# Patient Record
Sex: FEMALE | Race: BLACK OR AFRICAN AMERICAN | NOT HISPANIC OR LATINO
[De-identification: names, ages, dates, MRNs, and addresses within clinical notes are randomized per-mention and may not be internally consistent; named-entity substitution may affect disease eponyms.]

---

## 2017-02-08 ENCOUNTER — APPOINTMENT (OUTPATIENT)
Dept: OBGYN | Facility: CLINIC | Age: 67
End: 2017-02-08

## 2017-02-08 VITALS
HEIGHT: 61 IN | DIASTOLIC BLOOD PRESSURE: 90 MMHG | WEIGHT: 166 LBS | BODY MASS INDEX: 31.34 KG/M2 | SYSTOLIC BLOOD PRESSURE: 150 MMHG

## 2017-02-08 DIAGNOSIS — Z63.5 DISRUPTION OF FAMILY BY SEPARATION AND DIVORCE: ICD-10-CM

## 2017-02-08 RX ORDER — ACETAMINOPHEN/DIPHENHYDRAMINE 500MG-25MG
TABLET ORAL
Refills: 0 | Status: ACTIVE | COMMUNITY

## 2017-02-08 SDOH — SOCIAL STABILITY - SOCIAL INSECURITY: DISRUPTION OF FAMILY BY SEPARATION AND DIVORCE: Z63.5

## 2017-02-14 LAB — CYTOLOGY CVX/VAG DOC THIN PREP: NORMAL

## 2017-02-28 ENCOUNTER — APPOINTMENT (OUTPATIENT)
Dept: MAMMOGRAPHY | Facility: IMAGING CENTER | Age: 67
End: 2017-02-28

## 2017-02-28 ENCOUNTER — OUTPATIENT (OUTPATIENT)
Dept: OUTPATIENT SERVICES | Facility: HOSPITAL | Age: 67
LOS: 1 days | End: 2017-02-28
Payer: MEDICARE

## 2017-02-28 ENCOUNTER — APPOINTMENT (OUTPATIENT)
Dept: ULTRASOUND IMAGING | Facility: IMAGING CENTER | Age: 67
End: 2017-02-28

## 2017-02-28 DIAGNOSIS — R92.2 INCONCLUSIVE MAMMOGRAM: ICD-10-CM

## 2017-02-28 DIAGNOSIS — Z12.31 ENCOUNTER FOR SCREENING MAMMOGRAM FOR MALIGNANT NEOPLASM OF BREAST: ICD-10-CM

## 2017-02-28 PROCEDURE — 77063 BREAST TOMOSYNTHESIS BI: CPT

## 2017-02-28 PROCEDURE — 76641 ULTRASOUND BREAST COMPLETE: CPT

## 2017-02-28 PROCEDURE — 77067 SCR MAMMO BI INCL CAD: CPT

## 2017-03-06 ENCOUNTER — APPOINTMENT (OUTPATIENT)
Dept: ULTRASOUND IMAGING | Facility: IMAGING CENTER | Age: 67
End: 2017-03-06

## 2017-03-12 ENCOUNTER — FORM ENCOUNTER (OUTPATIENT)
Age: 67
End: 2017-03-12

## 2017-03-13 ENCOUNTER — APPOINTMENT (OUTPATIENT)
Dept: ULTRASOUND IMAGING | Facility: IMAGING CENTER | Age: 67
End: 2017-03-13

## 2017-03-13 ENCOUNTER — OUTPATIENT (OUTPATIENT)
Dept: OUTPATIENT SERVICES | Facility: HOSPITAL | Age: 67
LOS: 1 days | End: 2017-03-13
Payer: MEDICARE

## 2017-03-13 DIAGNOSIS — R92.2 INCONCLUSIVE MAMMOGRAM: ICD-10-CM

## 2017-03-13 PROCEDURE — 76642 ULTRASOUND BREAST LIMITED: CPT

## 2017-04-21 ENCOUNTER — APPOINTMENT (OUTPATIENT)
Dept: ULTRASOUND IMAGING | Facility: IMAGING CENTER | Age: 67
End: 2017-04-21

## 2017-04-21 ENCOUNTER — OUTPATIENT (OUTPATIENT)
Dept: OUTPATIENT SERVICES | Facility: HOSPITAL | Age: 67
LOS: 1 days | End: 2017-04-21
Payer: MEDICARE

## 2017-04-21 DIAGNOSIS — Z00.8 ENCOUNTER FOR OTHER GENERAL EXAMINATION: ICD-10-CM

## 2017-04-21 PROCEDURE — 76700 US EXAM ABDOM COMPLETE: CPT

## 2017-12-16 ENCOUNTER — EMERGENCY (EMERGENCY)
Facility: HOSPITAL | Age: 67
LOS: 1 days | Discharge: HOME | End: 2017-12-16
Attending: EMERGENCY MEDICINE | Admitting: EMERGENCY MEDICINE
Payer: MEDICARE

## 2017-12-16 VITALS
HEART RATE: 72 BPM | OXYGEN SATURATION: 97 % | SYSTOLIC BLOOD PRESSURE: 130 MMHG | HEIGHT: 62 IN | WEIGHT: 153 LBS | RESPIRATION RATE: 18 BRPM | DIASTOLIC BLOOD PRESSURE: 82 MMHG | TEMPERATURE: 99 F

## 2017-12-16 PROCEDURE — 99284 EMERGENCY DEPT VISIT MOD MDM: CPT | Mod: 25

## 2017-12-16 PROCEDURE — 70450 CT HEAD/BRAIN W/O DYE: CPT

## 2017-12-16 PROCEDURE — 70450 CT HEAD/BRAIN W/O DYE: CPT | Mod: 26

## 2017-12-16 PROCEDURE — 73660 X-RAY EXAM OF TOE(S): CPT | Mod: 26,LT

## 2017-12-16 PROCEDURE — 73660 X-RAY EXAM OF TOE(S): CPT

## 2017-12-16 PROCEDURE — 99284 EMERGENCY DEPT VISIT MOD MDM: CPT

## 2017-12-16 RX ORDER — ACETAMINOPHEN 500 MG
975 TABLET ORAL ONCE
Qty: 0 | Refills: 0 | Status: COMPLETED | OUTPATIENT
Start: 2017-12-16 | End: 2017-12-16

## 2017-12-16 RX ADMIN — Medication 975 MILLIGRAM(S): at 15:33

## 2017-12-16 NOTE — ED PROVIDER NOTE - ATTENDING CONTRIBUTION TO CARE
67F presenting with headache and left 3rd toe pain after fall at 2am; no loc, no n/v, no AC use.  On exam, well appearing in NAD.  nrl cardiac/lung sounds on auscultation.  abd soft nt/nd, no focal neurologic deficits.  3rd left toe painful to touch, no gross deformity, sensation and cap refill intact.  Xray obtained, detailed 3rd toe fracture.  Splinted and place in hard sole shoe.  CT head shows no acute findings.  Stable for dc with podiatry f/u.

## 2017-12-16 NOTE — ED PROCEDURE NOTE - ATTENDING CONTRIBUTION TO CARE
I have participated in and supervised all key portions of the above procedures and agree with the above documentation. MAME Contreras MD

## 2017-12-16 NOTE — ED PROVIDER NOTE - PHYSICAL EXAMINATION
NAD, VSS, Afebrile, No facial or scalp hematoma or swelling, No spinal tender, No RIB or CVA tender, No pelvic or hip tender with full ROMs of hips, + left foot drop with diminished sensory (known, chronic, no changes). + left 3rd toe swelling with tender, no deformity.

## 2017-12-16 NOTE — ED PROVIDER NOTE - OBJECTIVE STATEMENT
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 Pt stated she slipped when she walked down stairs at 2am and fell backward, hit he back of head on a step. Denies LOC or visual changes. Denies dizziness or N/V. Denies new sensory changes or weakness to extremities. Denies CP/SOB/ABD pain or Pelvis/ hip pain. Denies fever, chills or recnet sickness.

## 2017-12-16 NOTE — ED PROCEDURE NOTE - CPROC ED POST PROC CARE GUIDE1
Instructed patient/caregiver to follow-up with primary care physician./Elevate the injured extremity as instructed./Keep the cast/splint/dressing clean and dry./Instructed patient/caregiver regarding signs and symptoms of infection./Verbal/written post procedure instructions were given to patient/caregiver.

## 2018-04-19 ENCOUNTER — FORM ENCOUNTER (OUTPATIENT)
Age: 68
End: 2018-04-19

## 2018-04-20 ENCOUNTER — OUTPATIENT (OUTPATIENT)
Dept: OUTPATIENT SERVICES | Facility: HOSPITAL | Age: 68
LOS: 1 days | End: 2018-04-20
Payer: MEDICARE

## 2018-04-20 ENCOUNTER — APPOINTMENT (OUTPATIENT)
Dept: ULTRASOUND IMAGING | Facility: IMAGING CENTER | Age: 68
End: 2018-04-20
Payer: MEDICARE

## 2018-04-20 ENCOUNTER — APPOINTMENT (OUTPATIENT)
Dept: MAMMOGRAPHY | Facility: IMAGING CENTER | Age: 68
End: 2018-04-20
Payer: MEDICARE

## 2018-04-20 DIAGNOSIS — Z00.00 ENCOUNTER FOR GENERAL ADULT MEDICAL EXAMINATION WITHOUT ABNORMAL FINDINGS: ICD-10-CM

## 2018-04-20 PROCEDURE — 77067 SCR MAMMO BI INCL CAD: CPT | Mod: 26

## 2018-04-20 PROCEDURE — 76641 ULTRASOUND BREAST COMPLETE: CPT

## 2018-04-20 PROCEDURE — 77063 BREAST TOMOSYNTHESIS BI: CPT | Mod: 26

## 2018-04-20 PROCEDURE — 76641 ULTRASOUND BREAST COMPLETE: CPT | Mod: 26,50

## 2018-04-20 PROCEDURE — 77063 BREAST TOMOSYNTHESIS BI: CPT

## 2018-04-20 PROCEDURE — 77067 SCR MAMMO BI INCL CAD: CPT

## 2018-05-05 ENCOUNTER — TRANSCRIPTION ENCOUNTER (OUTPATIENT)
Age: 68
End: 2018-05-05

## 2018-10-23 ENCOUNTER — OTHER (OUTPATIENT)
Age: 68
End: 2018-10-23

## 2018-10-23 PROBLEM — M81.0 AGE-RELATED OSTEOPOROSIS WITHOUT CURRENT PATHOLOGICAL FRACTURE: Chronic | Status: ACTIVE | Noted: 2017-12-16

## 2018-10-23 PROBLEM — I10 ESSENTIAL (PRIMARY) HYPERTENSION: Chronic | Status: ACTIVE | Noted: 2017-12-16

## 2018-10-23 PROBLEM — F41.9 ANXIETY DISORDER, UNSPECIFIED: Chronic | Status: ACTIVE | Noted: 2017-12-16

## 2018-11-05 ENCOUNTER — FORM ENCOUNTER (OUTPATIENT)
Age: 68
End: 2018-11-05

## 2018-11-06 ENCOUNTER — APPOINTMENT (OUTPATIENT)
Dept: ULTRASOUND IMAGING | Facility: IMAGING CENTER | Age: 68
End: 2018-11-06
Payer: MEDICARE

## 2018-11-06 ENCOUNTER — OUTPATIENT (OUTPATIENT)
Dept: OUTPATIENT SERVICES | Facility: HOSPITAL | Age: 68
LOS: 1 days | End: 2018-11-06
Payer: MEDICARE

## 2018-11-06 DIAGNOSIS — R10.2 PELVIC AND PERINEAL PAIN: ICD-10-CM

## 2018-11-06 PROCEDURE — 76830 TRANSVAGINAL US NON-OB: CPT

## 2018-11-06 PROCEDURE — 76856 US EXAM PELVIC COMPLETE: CPT | Mod: 26

## 2018-11-06 PROCEDURE — 76856 US EXAM PELVIC COMPLETE: CPT

## 2018-11-06 PROCEDURE — 76830 TRANSVAGINAL US NON-OB: CPT | Mod: 26

## 2018-11-29 ENCOUNTER — APPOINTMENT (OUTPATIENT)
Dept: OBGYN | Facility: CLINIC | Age: 68
End: 2018-11-29
Payer: MEDICARE

## 2018-11-29 VITALS
SYSTOLIC BLOOD PRESSURE: 148 MMHG | DIASTOLIC BLOOD PRESSURE: 78 MMHG | BODY MASS INDEX: 28.16 KG/M2 | WEIGHT: 153 LBS | HEIGHT: 62 IN

## 2018-11-29 PROCEDURE — 99213 OFFICE O/P EST LOW 20 MIN: CPT

## 2019-04-04 ENCOUNTER — OTHER (OUTPATIENT)
Age: 69
End: 2019-04-04

## 2019-04-08 ENCOUNTER — APPOINTMENT (OUTPATIENT)
Dept: GASTROENTEROLOGY | Facility: CLINIC | Age: 69
End: 2019-04-08
Payer: MEDICARE

## 2019-04-08 VITALS
HEART RATE: 84 BPM | RESPIRATION RATE: 15 BRPM | OXYGEN SATURATION: 98 % | HEIGHT: 62 IN | SYSTOLIC BLOOD PRESSURE: 144 MMHG | WEIGHT: 150 LBS | DIASTOLIC BLOOD PRESSURE: 82 MMHG | BODY MASS INDEX: 27.6 KG/M2

## 2019-04-08 DIAGNOSIS — Z86.010 PERSONAL HISTORY OF COLONIC POLYPS: ICD-10-CM

## 2019-04-08 DIAGNOSIS — Z86.79 PERSONAL HISTORY OF OTHER DISEASES OF THE CIRCULATORY SYSTEM: ICD-10-CM

## 2019-04-08 PROCEDURE — 99202 OFFICE O/P NEW SF 15 MIN: CPT

## 2019-04-08 RX ORDER — DILTIAZEM HYDROCHLORIDE 120 MG/1
120 TABLET ORAL
Refills: 0 | Status: ACTIVE | COMMUNITY

## 2019-04-08 NOTE — ASSESSMENT
[FreeTextEntry1] : This is a 69-year-old female presenting for colon cancer screening evaluation. I explained to her the risks, alternatives and benefits of a colonoscopy. Risk including but not limited to bleeding, perforation, infection adverse medication reaction. Questions were answered. She stated understanding.

## 2019-04-23 ENCOUNTER — FORM ENCOUNTER (OUTPATIENT)
Age: 69
End: 2019-04-23

## 2019-04-24 ENCOUNTER — APPOINTMENT (OUTPATIENT)
Dept: MAMMOGRAPHY | Facility: IMAGING CENTER | Age: 69
End: 2019-04-24
Payer: MEDICARE

## 2019-04-24 ENCOUNTER — APPOINTMENT (OUTPATIENT)
Dept: ULTRASOUND IMAGING | Facility: IMAGING CENTER | Age: 69
End: 2019-04-24
Payer: MEDICARE

## 2019-04-24 ENCOUNTER — OUTPATIENT (OUTPATIENT)
Dept: OUTPATIENT SERVICES | Facility: HOSPITAL | Age: 69
LOS: 1 days | End: 2019-04-24
Payer: MEDICARE

## 2019-04-24 DIAGNOSIS — Z01.419 ENCOUNTER FOR GYNECOLOGICAL EXAMINATION (GENERAL) (ROUTINE) WITHOUT ABNORMAL FINDINGS: ICD-10-CM

## 2019-04-24 PROCEDURE — 77067 SCR MAMMO BI INCL CAD: CPT | Mod: 26

## 2019-04-24 PROCEDURE — 76641 ULTRASOUND BREAST COMPLETE: CPT

## 2019-04-24 PROCEDURE — 77063 BREAST TOMOSYNTHESIS BI: CPT

## 2019-04-24 PROCEDURE — 76641 ULTRASOUND BREAST COMPLETE: CPT | Mod: 26,50

## 2019-04-24 PROCEDURE — 77063 BREAST TOMOSYNTHESIS BI: CPT | Mod: 26

## 2019-04-24 PROCEDURE — 77067 SCR MAMMO BI INCL CAD: CPT

## 2019-04-29 ENCOUNTER — TRANSCRIPTION ENCOUNTER (OUTPATIENT)
Age: 69
End: 2019-04-29

## 2019-05-02 ENCOUNTER — APPOINTMENT (OUTPATIENT)
Dept: OBGYN | Facility: CLINIC | Age: 69
End: 2019-05-02
Payer: MEDICARE

## 2019-05-02 VITALS — DIASTOLIC BLOOD PRESSURE: 84 MMHG | SYSTOLIC BLOOD PRESSURE: 134 MMHG

## 2019-05-02 VITALS
HEIGHT: 62 IN | BODY MASS INDEX: 27.97 KG/M2 | DIASTOLIC BLOOD PRESSURE: 84 MMHG | SYSTOLIC BLOOD PRESSURE: 168 MMHG | WEIGHT: 152 LBS

## 2019-05-02 PROCEDURE — G0101: CPT

## 2019-05-02 NOTE — COUNSELING
[Breast Self Exam] : breast self exam [Nutrition] : nutrition [Exercise] : exercise [Vitamins/Supplements] : vitamins/supplements [Bladder Hygiene] : bladder hygiene [Vaccines] : vaccines [Vulvar Hygiene] : vulvar hygiene

## 2019-05-02 NOTE — PHYSICAL EXAM
[Awake] : awake [Alert] : alert [Acute Distress] : no acute distress [Mass] : no breast mass [Nipple Discharge] : no nipple discharge [Axillary LAD] : no axillary lymphadenopathy [Soft] : soft [Tender] : non tender [Oriented x3] : oriented to person, place, and time [Normal] : uterus [Atrophy] : atrophy [No Bleeding] : there was no active vaginal bleeding [Uterine Adnexae] : were not tender and not enlarged [FreeTextEntry5] : posterior

## 2019-05-07 LAB — CYTOLOGY CVX/VAG DOC THIN PREP: NORMAL

## 2019-07-12 ENCOUNTER — APPOINTMENT (OUTPATIENT)
Dept: GASTROENTEROLOGY | Facility: AMBULATORY MEDICAL SERVICES | Age: 69
End: 2019-07-12
Payer: MEDICARE

## 2019-07-12 PROCEDURE — 45380 COLONOSCOPY AND BIOPSY: CPT

## 2019-11-02 ENCOUNTER — INPATIENT (INPATIENT)
Facility: HOSPITAL | Age: 69
LOS: 3 days | Discharge: HOME | DRG: 571 | End: 2019-11-06
Attending: INTERNAL MEDICINE | Admitting: INTERNAL MEDICINE
Payer: MEDICARE

## 2019-11-02 VITALS
WEIGHT: 156.97 LBS | RESPIRATION RATE: 20 BRPM | SYSTOLIC BLOOD PRESSURE: 150 MMHG | TEMPERATURE: 98 F | OXYGEN SATURATION: 96 % | HEIGHT: 62 IN | HEART RATE: 70 BPM | DIASTOLIC BLOOD PRESSURE: 83 MMHG

## 2019-11-02 DIAGNOSIS — I10 ESSENTIAL (PRIMARY) HYPERTENSION: ICD-10-CM

## 2019-11-02 DIAGNOSIS — L03.116 CELLULITIS OF LEFT LOWER LIMB: ICD-10-CM

## 2019-11-02 DIAGNOSIS — L03.90 CELLULITIS, UNSPECIFIED: ICD-10-CM

## 2019-11-02 DIAGNOSIS — C49.9 MALIGNANT NEOPLASM OF CONNECTIVE AND SOFT TISSUE, UNSPECIFIED: Chronic | ICD-10-CM

## 2019-11-02 LAB
ALBUMIN SERPL ELPH-MCNC: 3.8 G/DL — SIGNIFICANT CHANGE UP (ref 3.3–5)
ALP SERPL-CCNC: 61 U/L — SIGNIFICANT CHANGE UP (ref 40–120)
ALT FLD-CCNC: 13 U/L — SIGNIFICANT CHANGE UP (ref 10–45)
ANION GAP SERPL CALC-SCNC: 9 MMOL/L — SIGNIFICANT CHANGE UP (ref 5–17)
APPEARANCE UR: CLEAR — SIGNIFICANT CHANGE UP
APTT BLD: 29.8 SEC — SIGNIFICANT CHANGE UP (ref 27.5–36.3)
AST SERPL-CCNC: 20 U/L — SIGNIFICANT CHANGE UP (ref 10–40)
BASOPHILS # BLD AUTO: 0.03 K/UL — SIGNIFICANT CHANGE UP (ref 0–0.2)
BASOPHILS NFR BLD AUTO: 0.5 % — SIGNIFICANT CHANGE UP (ref 0–2)
BILIRUB SERPL-MCNC: 0.5 MG/DL — SIGNIFICANT CHANGE UP (ref 0.2–1.2)
BILIRUB UR-MCNC: NEGATIVE — SIGNIFICANT CHANGE UP
BUN SERPL-MCNC: 19 MG/DL — SIGNIFICANT CHANGE UP (ref 7–23)
CALCIUM SERPL-MCNC: 9.4 MG/DL — SIGNIFICANT CHANGE UP (ref 8.4–10.5)
CHLORIDE SERPL-SCNC: 100 MMOL/L — SIGNIFICANT CHANGE UP (ref 96–108)
CO2 SERPL-SCNC: 26 MMOL/L — SIGNIFICANT CHANGE UP (ref 22–31)
COLOR SPEC: YELLOW — SIGNIFICANT CHANGE UP
CREAT SERPL-MCNC: 0.54 MG/DL — SIGNIFICANT CHANGE UP (ref 0.5–1.3)
DIFF PNL FLD: NEGATIVE — SIGNIFICANT CHANGE UP
EOSINOPHIL # BLD AUTO: 0.06 K/UL — SIGNIFICANT CHANGE UP (ref 0–0.5)
EOSINOPHIL NFR BLD AUTO: 1 % — SIGNIFICANT CHANGE UP (ref 0–6)
GAS PNL BLDV: SIGNIFICANT CHANGE UP
GLUCOSE SERPL-MCNC: 102 MG/DL — HIGH (ref 70–99)
GLUCOSE UR QL: NEGATIVE — SIGNIFICANT CHANGE UP
HCT VFR BLD CALC: 35 % — SIGNIFICANT CHANGE UP (ref 34.5–45)
HGB BLD-MCNC: 11.3 G/DL — LOW (ref 11.5–15.5)
IMM GRANULOCYTES NFR BLD AUTO: 0.2 % — SIGNIFICANT CHANGE UP (ref 0–1.5)
INR BLD: 1.17 RATIO — HIGH (ref 0.88–1.16)
KETONES UR-MCNC: NEGATIVE — SIGNIFICANT CHANGE UP
LEUKOCYTE ESTERASE UR-ACNC: NEGATIVE — SIGNIFICANT CHANGE UP
LYMPHOCYTES # BLD AUTO: 1.83 K/UL — SIGNIFICANT CHANGE UP (ref 1–3.3)
LYMPHOCYTES # BLD AUTO: 29.8 % — SIGNIFICANT CHANGE UP (ref 13–44)
MCHC RBC-ENTMCNC: 29.1 PG — SIGNIFICANT CHANGE UP (ref 27–34)
MCHC RBC-ENTMCNC: 32.3 GM/DL — SIGNIFICANT CHANGE UP (ref 32–36)
MCV RBC AUTO: 90.2 FL — SIGNIFICANT CHANGE UP (ref 80–100)
MONOCYTES # BLD AUTO: 0.57 K/UL — SIGNIFICANT CHANGE UP (ref 0–0.9)
MONOCYTES NFR BLD AUTO: 9.3 % — SIGNIFICANT CHANGE UP (ref 2–14)
NEUTROPHILS # BLD AUTO: 3.64 K/UL — SIGNIFICANT CHANGE UP (ref 1.8–7.4)
NEUTROPHILS NFR BLD AUTO: 59.2 % — SIGNIFICANT CHANGE UP (ref 43–77)
NITRITE UR-MCNC: NEGATIVE — SIGNIFICANT CHANGE UP
NRBC # BLD: 0 /100 WBCS — SIGNIFICANT CHANGE UP (ref 0–0)
PH UR: 6.5 — SIGNIFICANT CHANGE UP (ref 5–8)
PLATELET # BLD AUTO: 261 K/UL — SIGNIFICANT CHANGE UP (ref 150–400)
POTASSIUM SERPL-MCNC: 3.5 MMOL/L — SIGNIFICANT CHANGE UP (ref 3.5–5.3)
POTASSIUM SERPL-SCNC: 3.5 MMOL/L — SIGNIFICANT CHANGE UP (ref 3.5–5.3)
PROT SERPL-MCNC: 8 G/DL — SIGNIFICANT CHANGE UP (ref 6–8.3)
PROT UR-MCNC: ABNORMAL
PROTHROM AB SERPL-ACNC: 13.5 SEC — HIGH (ref 10–12.9)
RBC # BLD: 3.88 M/UL — SIGNIFICANT CHANGE UP (ref 3.8–5.2)
RBC # FLD: 11.9 % — SIGNIFICANT CHANGE UP (ref 10.3–14.5)
SODIUM SERPL-SCNC: 135 MMOL/L — SIGNIFICANT CHANGE UP (ref 135–145)
SP GR SPEC: 1.02 — SIGNIFICANT CHANGE UP (ref 1.01–1.02)
UROBILINOGEN FLD QL: NEGATIVE — SIGNIFICANT CHANGE UP
WBC # BLD: 6.14 K/UL — SIGNIFICANT CHANGE UP (ref 3.8–10.5)
WBC # FLD AUTO: 6.14 K/UL — SIGNIFICANT CHANGE UP (ref 3.8–10.5)

## 2019-11-02 PROCEDURE — 73590 X-RAY EXAM OF LOWER LEG: CPT | Mod: 26,LT

## 2019-11-02 PROCEDURE — 99285 EMERGENCY DEPT VISIT HI MDM: CPT | Mod: GC

## 2019-11-02 PROCEDURE — 93010 ELECTROCARDIOGRAM REPORT: CPT

## 2019-11-02 PROCEDURE — 73701 CT LOWER EXTREMITY W/DYE: CPT | Mod: 26,LT

## 2019-11-02 RX ORDER — HEPARIN SODIUM 5000 [USP'U]/ML
5000 INJECTION INTRAVENOUS; SUBCUTANEOUS
Refills: 0 | Status: DISCONTINUED | OUTPATIENT
Start: 2019-11-02 | End: 2019-11-06

## 2019-11-02 RX ORDER — PIPERACILLIN AND TAZOBACTAM 4; .5 G/20ML; G/20ML
3.38 INJECTION, POWDER, LYOPHILIZED, FOR SOLUTION INTRAVENOUS ONCE
Refills: 0 | Status: COMPLETED | OUTPATIENT
Start: 2019-11-02 | End: 2019-11-02

## 2019-11-02 RX ORDER — LOSARTAN POTASSIUM 100 MG/1
100 TABLET, FILM COATED ORAL DAILY
Refills: 0 | Status: DISCONTINUED | OUTPATIENT
Start: 2019-11-02 | End: 2019-11-06

## 2019-11-02 RX ORDER — DIPHENHYDRAMINE HCL 50 MG
50 CAPSULE ORAL ONCE
Refills: 0 | Status: COMPLETED | OUTPATIENT
Start: 2019-11-02 | End: 2019-11-02

## 2019-11-02 RX ORDER — VANCOMYCIN HCL 1 G
500 VIAL (EA) INTRAVENOUS EVERY 12 HOURS
Refills: 0 | Status: DISCONTINUED | OUTPATIENT
Start: 2019-11-02 | End: 2019-11-04

## 2019-11-02 RX ORDER — GABAPENTIN 400 MG/1
300 CAPSULE ORAL DAILY
Refills: 0 | Status: DISCONTINUED | OUTPATIENT
Start: 2019-11-02 | End: 2019-11-03

## 2019-11-02 RX ORDER — VANCOMYCIN HCL 1 G
1000 VIAL (EA) INTRAVENOUS ONCE
Refills: 0 | Status: COMPLETED | OUTPATIENT
Start: 2019-11-02 | End: 2019-11-02

## 2019-11-02 RX ORDER — SODIUM CHLORIDE 9 MG/ML
1000 INJECTION, SOLUTION INTRAVENOUS ONCE
Refills: 0 | Status: COMPLETED | OUTPATIENT
Start: 2019-11-02 | End: 2019-11-02

## 2019-11-02 RX ORDER — DILTIAZEM HCL 120 MG
120 CAPSULE, EXT RELEASE 24 HR ORAL DAILY
Refills: 0 | Status: DISCONTINUED | OUTPATIENT
Start: 2019-11-02 | End: 2019-11-02

## 2019-11-02 RX ORDER — SERTRALINE 25 MG/1
50 TABLET, FILM COATED ORAL DAILY
Refills: 0 | Status: DISCONTINUED | OUTPATIENT
Start: 2019-11-02 | End: 2019-11-06

## 2019-11-02 RX ORDER — DILTIAZEM HCL 120 MG
120 CAPSULE, EXT RELEASE 24 HR ORAL DAILY
Refills: 0 | Status: DISCONTINUED | OUTPATIENT
Start: 2019-11-02 | End: 2019-11-06

## 2019-11-02 RX ORDER — LOSARTAN POTASSIUM 100 MG/1
1 TABLET, FILM COATED ORAL
Qty: 0 | Refills: 0 | DISCHARGE

## 2019-11-02 RX ADMIN — Medication 250 MILLIGRAM(S): at 13:10

## 2019-11-02 RX ADMIN — GABAPENTIN 300 MILLIGRAM(S): 400 CAPSULE ORAL at 21:36

## 2019-11-02 RX ADMIN — HEPARIN SODIUM 5000 UNIT(S): 5000 INJECTION INTRAVENOUS; SUBCUTANEOUS at 21:35

## 2019-11-02 RX ADMIN — Medication 50 MILLIGRAM(S): at 15:53

## 2019-11-02 RX ADMIN — PIPERACILLIN AND TAZOBACTAM 200 GRAM(S): 4; .5 INJECTION, POWDER, LYOPHILIZED, FOR SOLUTION INTRAVENOUS at 16:15

## 2019-11-02 RX ADMIN — SERTRALINE 50 MILLIGRAM(S): 25 TABLET, FILM COATED ORAL at 21:35

## 2019-11-02 RX ADMIN — SODIUM CHLORIDE 1000 MILLILITER(S): 9 INJECTION, SOLUTION INTRAVENOUS at 13:10

## 2019-11-02 RX ADMIN — Medication 120 MILLIGRAM(S): at 21:36

## 2019-11-02 NOTE — ED PROVIDER NOTE - PROGRESS NOTE DETAILS
Anthony DAMON: pt with no WBC elevation but ESR 79. Xray w/o gas. CT without e/o osteo or abscess. given abx for cellulitis. admitted to dr. spann

## 2019-11-02 NOTE — ED ADULT NURSE NOTE - OBJECTIVE STATEMENT
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 Pt had US and labs on Thursday showing no abscess, has been on Augmentin since Monday 10/28. Pt also c/o intermittent fevers x 1 week, Tmax 100.8F at home. 8/10 pain to lateral L shin with erythema, swelling, warmth.

## 2019-11-02 NOTE — H&P ADULT - PROBLEM SELECTOR PLAN 1
Failed out patient PO Augmentin. Left leg has an open wound. CT with a radio opaque area, unknown etiology. will need a culture from any wound discharge. Empiric Vanco started

## 2019-11-02 NOTE — H&P ADULT - HISTORY OF PRESENT ILLNESS
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 Drainage started three weeks ago after pt removed a "strand-like protrusion" from the area. Drainage was white chalk-like initially, but now has turned yellow and has a strong odor. Ultrasound and blood work was done at [facility] on Thursday. No evidence of abscess at that time. Pt has been on augmentin since Monday 10/28. The drainage amount had dec since monday, but the smell has worsened. Pt endorses fever of 100.8 since last Sunday and has been coming back intermittently throughout the last week. Last time she had a fever was this AM. The patient endorses warmth, swelling and sharp pain in the LLE now. Skin feels very sensistive. No nsaids today, has taken then occ throughout the week.

## 2019-11-02 NOTE — ED ADULT NURSE REASSESSMENT NOTE - NS ED NURSE REASSESS COMMENT FT1
Pt c/o of itching in her hands, head & rectum. Pt states that she felt it after receiving the contrast. MD made aware. Benadryl IV ordered and given. CT made aware as well. Family @ bedside.

## 2019-11-02 NOTE — ED PROVIDER NOTE - OBJECTIVE STATEMENT
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 Drainage started three weeks ago after pt removed a "strand-like protrusion" from the area. Drainage was white chalk-like initially, but now has turned yellow and has a strong odor. Ultrasound and blood work was done at [facility] on Thursday. No evidence of abscess at that time. Pt has been on augmentin since Monday 10/28. The drainage amount had dec since monday, but the smell has worsened. Pt endorses fever of 100.8 since last Sunday and has been coming back intermittently throughout the last week. Last time she had a fever was this AM. The patient endorses warmth, swelling and sharp pain in the LLE now. Skin feels very sensistive. No nsaids today, has taken then occ throughout the week.    Med: Losartan, diltiazem, sertraline  Allerg: compazine (throat closes)  Surg: liposarcoma resection on the left leg with multiple reconstructive surgeries, L foot surgeries  Soc: Lives alone, retired banker, denies smoker, etoh, rec drugs  PMD: Dr. Giorgio Sol at [facility] in [place]

## 2019-11-02 NOTE — ED ADULT NURSE NOTE - NSIMPLEMENTINTERV_GEN_ALL_ED
Implemented All Universal Safety Interventions:  [place] to call system. Call bell, personal items and telephone within reach. Instruct patient to call for assistance. Room bathroom lighting operational. Non-slip footwear when patient is off stretcher. Physically safe environment: no spills, clutter or unnecessary equipment. Stretcher in lowest position, wheels locked, appropriate side rails in place.

## 2019-11-02 NOTE — ED PROVIDER NOTE - ATTENDING CONTRIBUTION TO CARE
Attending MD Simon:  I personally have seen and examined this patient.  Resident note reviewed and agree on plan of care and except where noted.  See HPI, PE, and MDM for details.      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 Given complex surgical history, will obtain CT to r/o deeper space infection/abscess, IV abx, cultures, admission

## 2019-11-02 NOTE — ED PROVIDER NOTE - PHYSICAL EXAMINATION
General: WN/WD NAD  HEENT: PERRLA, EOMI, moist mucous membranes  Neurology: A&Ox3, nonfocal, SIMMS x 4  Respiratory: CTA B/L, normal respiratory effort, no wheezes, crackles, rales  CV: RRR, S1S2, no murmurs, rubs or gallops  Abdominal: Soft, NT, ND +BS, Last BM  Extremities: 1+ pitting edema of the LLE below the knee, + peripheral pulses  Incisions: LLE s/p multiple surgeries with well healed scars; 2cm area of draining puss in the lateral compartment of the lower leg with foul odor;

## 2019-11-03 LAB
ALBUMIN SERPL ELPH-MCNC: 3.6 G/DL — SIGNIFICANT CHANGE UP (ref 3.3–5)
ALP SERPL-CCNC: 53 U/L — SIGNIFICANT CHANGE UP (ref 40–120)
ALT FLD-CCNC: 12 U/L — SIGNIFICANT CHANGE UP (ref 10–45)
ANION GAP SERPL CALC-SCNC: 9 MMOL/L — SIGNIFICANT CHANGE UP (ref 5–17)
AST SERPL-CCNC: 15 U/L — SIGNIFICANT CHANGE UP (ref 10–40)
BASOPHILS # BLD AUTO: 0.04 K/UL — SIGNIFICANT CHANGE UP (ref 0–0.2)
BASOPHILS NFR BLD AUTO: 0.7 % — SIGNIFICANT CHANGE UP (ref 0–2)
BILIRUB SERPL-MCNC: 0.5 MG/DL — SIGNIFICANT CHANGE UP (ref 0.2–1.2)
BUN SERPL-MCNC: 14 MG/DL — SIGNIFICANT CHANGE UP (ref 7–23)
CALCIUM SERPL-MCNC: 8.7 MG/DL — SIGNIFICANT CHANGE UP (ref 8.4–10.5)
CHLORIDE SERPL-SCNC: 103 MMOL/L — SIGNIFICANT CHANGE UP (ref 96–108)
CO2 SERPL-SCNC: 28 MMOL/L — SIGNIFICANT CHANGE UP (ref 22–31)
CREAT SERPL-MCNC: 0.55 MG/DL — SIGNIFICANT CHANGE UP (ref 0.5–1.3)
CRP SERPL-MCNC: 4.34 MG/DL — HIGH (ref 0–0.4)
CULTURE RESULTS: NO GROWTH — SIGNIFICANT CHANGE UP
EOSINOPHIL # BLD AUTO: 0.09 K/UL — SIGNIFICANT CHANGE UP (ref 0–0.5)
EOSINOPHIL NFR BLD AUTO: 1.6 % — SIGNIFICANT CHANGE UP (ref 0–6)
GLUCOSE SERPL-MCNC: 99 MG/DL — SIGNIFICANT CHANGE UP (ref 70–99)
HCT VFR BLD CALC: 33 % — LOW (ref 34.5–45)
HCV AB S/CO SERPL IA: 0.25 S/CO — SIGNIFICANT CHANGE UP (ref 0–0.99)
HCV AB SERPL-IMP: SIGNIFICANT CHANGE UP
HGB BLD-MCNC: 10.8 G/DL — LOW (ref 11.5–15.5)
IMM GRANULOCYTES NFR BLD AUTO: 0.2 % — SIGNIFICANT CHANGE UP (ref 0–1.5)
LACTATE SERPL-SCNC: 0.6 MMOL/L — LOW (ref 0.7–2)
LYMPHOCYTES # BLD AUTO: 2.44 K/UL — SIGNIFICANT CHANGE UP (ref 1–3.3)
LYMPHOCYTES # BLD AUTO: 42.9 % — SIGNIFICANT CHANGE UP (ref 13–44)
MCHC RBC-ENTMCNC: 29.8 PG — SIGNIFICANT CHANGE UP (ref 27–34)
MCHC RBC-ENTMCNC: 32.7 GM/DL — SIGNIFICANT CHANGE UP (ref 32–36)
MCV RBC AUTO: 90.9 FL — SIGNIFICANT CHANGE UP (ref 80–100)
MONOCYTES # BLD AUTO: 0.54 K/UL — SIGNIFICANT CHANGE UP (ref 0–0.9)
MONOCYTES NFR BLD AUTO: 9.5 % — SIGNIFICANT CHANGE UP (ref 2–14)
NEUTROPHILS # BLD AUTO: 2.57 K/UL — SIGNIFICANT CHANGE UP (ref 1.8–7.4)
NEUTROPHILS NFR BLD AUTO: 45.1 % — SIGNIFICANT CHANGE UP (ref 43–77)
PLATELET # BLD AUTO: 261 K/UL — SIGNIFICANT CHANGE UP (ref 150–400)
POTASSIUM SERPL-MCNC: 3.2 MMOL/L — LOW (ref 3.5–5.3)
POTASSIUM SERPL-SCNC: 3.2 MMOL/L — LOW (ref 3.5–5.3)
PROT SERPL-MCNC: 7.3 G/DL — SIGNIFICANT CHANGE UP (ref 6–8.3)
RBC # BLD: 3.63 M/UL — LOW (ref 3.8–5.2)
RBC # FLD: 11.9 % — SIGNIFICANT CHANGE UP (ref 10.3–14.5)
SODIUM SERPL-SCNC: 140 MMOL/L — SIGNIFICANT CHANGE UP (ref 135–145)
SPECIMEN SOURCE: SIGNIFICANT CHANGE UP
WBC # BLD: 5.69 K/UL — SIGNIFICANT CHANGE UP (ref 3.8–10.5)
WBC # FLD AUTO: 5.69 K/UL — SIGNIFICANT CHANGE UP (ref 3.8–10.5)

## 2019-11-03 RX ORDER — PIPERACILLIN AND TAZOBACTAM 4; .5 G/20ML; G/20ML
3.38 INJECTION, POWDER, LYOPHILIZED, FOR SOLUTION INTRAVENOUS EVERY 8 HOURS
Refills: 0 | Status: DISCONTINUED | OUTPATIENT
Start: 2019-11-03 | End: 2019-11-06

## 2019-11-03 RX ORDER — GABAPENTIN 400 MG/1
300 CAPSULE ORAL THREE TIMES A DAY
Refills: 0 | Status: DISCONTINUED | OUTPATIENT
Start: 2019-11-03 | End: 2019-11-06

## 2019-11-03 RX ORDER — POTASSIUM CHLORIDE 20 MEQ
40 PACKET (EA) ORAL EVERY 4 HOURS
Refills: 0 | Status: COMPLETED | OUTPATIENT
Start: 2019-11-03 | End: 2019-11-03

## 2019-11-03 RX ORDER — PIPERACILLIN AND TAZOBACTAM 4; .5 G/20ML; G/20ML
3.38 INJECTION, POWDER, LYOPHILIZED, FOR SOLUTION INTRAVENOUS ONCE
Refills: 0 | Status: COMPLETED | OUTPATIENT
Start: 2019-11-03 | End: 2019-11-03

## 2019-11-03 RX ADMIN — HEPARIN SODIUM 5000 UNIT(S): 5000 INJECTION INTRAVENOUS; SUBCUTANEOUS at 18:16

## 2019-11-03 RX ADMIN — Medication 100 MILLIGRAM(S): at 14:56

## 2019-11-03 RX ADMIN — GABAPENTIN 300 MILLIGRAM(S): 400 CAPSULE ORAL at 05:31

## 2019-11-03 RX ADMIN — GABAPENTIN 300 MILLIGRAM(S): 400 CAPSULE ORAL at 13:22

## 2019-11-03 RX ADMIN — Medication 120 MILLIGRAM(S): at 21:47

## 2019-11-03 RX ADMIN — Medication 40 MILLIEQUIVALENT(S): at 18:16

## 2019-11-03 RX ADMIN — Medication 40 MILLIEQUIVALENT(S): at 21:47

## 2019-11-03 RX ADMIN — HEPARIN SODIUM 5000 UNIT(S): 5000 INJECTION INTRAVENOUS; SUBCUTANEOUS at 05:31

## 2019-11-03 RX ADMIN — LOSARTAN POTASSIUM 100 MILLIGRAM(S): 100 TABLET, FILM COATED ORAL at 05:31

## 2019-11-03 RX ADMIN — SERTRALINE 50 MILLIGRAM(S): 25 TABLET, FILM COATED ORAL at 13:22

## 2019-11-03 RX ADMIN — GABAPENTIN 300 MILLIGRAM(S): 400 CAPSULE ORAL at 21:47

## 2019-11-03 RX ADMIN — PIPERACILLIN AND TAZOBACTAM 25 GRAM(S): 4; .5 INJECTION, POWDER, LYOPHILIZED, FOR SOLUTION INTRAVENOUS at 21:47

## 2019-11-03 RX ADMIN — Medication 100 MILLIGRAM(S): at 00:47

## 2019-11-03 RX ADMIN — PIPERACILLIN AND TAZOBACTAM 200 GRAM(S): 4; .5 INJECTION, POWDER, LYOPHILIZED, FOR SOLUTION INTRAVENOUS at 16:31

## 2019-11-03 NOTE — PROGRESS NOTE ADULT - PROBLEM SELECTOR PLAN 1
Failed out patient PO Augmentin. Left leg has an open wound. CT with a radio opaque area, unknown etiology. will need a culture from any wound discharge. Empiric Vanco started  ID eval noted  Wound care eval in AM

## 2019-11-03 NOTE — CONSULT NOTE ADULT - SUBJECTIVE AND OBJECTIVE BOX
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 Drainage started three weeks ago after pt removed a "strand-like protrusion" from the area. Drainage was white chalk-like initially, but now has turned yellow and has a strong odor. Ultrasound and blood work was done at [facility] on Thursday. No evidence of abscess at that time. Pt has been on augmentin since Monday 10/28. The drainage amount had dec since monday, but the smell has worsened. Pt endorses fever of 100.8 since last  and has been coming back intermittently throughout the last week. Last time she had a fever was this AM. The patient endorses warmth, swelling and sharp pain in the LLE now. Skin feels very sensistive. No nsaids today, has taken then occ throughout the week. (2019 19:13)      REVIEW OF SYSTEMS:    CONSTITUTIONAL: No fever, weight loss, or fatigue  EYES: No eye pain, visual disturbances, or discharge  ENMT:  No sore throat  NECK: No pain or stiffness  RESPIRATORY: No cough, wheezing, chills or hemoptysis; No shortness of breath  CARDIOVASCULAR: No chest pain, palpitations, dizziness, or leg swelling  GASTROINTESTINAL: No abdominal or epigastric pain. No nausea, vomiting, or hematemesis; No diarrhea or constipation. No melena or hematochezia.  GENITOURINARY: No dysuria, frequency, hematuria, or incontinence  NEUROLOGICAL: No headaches, memory loss, loss of strength, numbness, or tremors  SKIN: No itching, burning, rashes, or lesions   LYMPH NODES: No enlarged glands  MUSCULOSKELETAL: No joint pain or swelling; No muscle, back, or extremity pain      PAST MEDICAL & SURGICAL HISTORY:  Osteoporosis  Anxiety  Hypertension  Primary liposarcoma of soft tissue of extremity      Allergies    Compazine (Swelling)    Intolerances        FAMILY HISTORY:  No pertinent fam hx in 1st degree relatives    SOCIAL HISTORY:        MEDICATIONS  (STANDING):  diltiazem    milliGRAM(s) Oral daily  gabapentin 300 milliGRAM(s) Oral three times a day  heparin  Injectable 5000 Unit(s) SubCutaneous two times a day  losartan 100 milliGRAM(s) Oral daily  sertraline 50 milliGRAM(s) Oral daily  vancomycin  IVPB 500 milliGRAM(s) IV Intermittent every 12 hours    MEDICATIONS  (PRN):      Vital Signs Last 24 Hrs  T(C): 37.2 (2019 08:14), Max: 37.6 (2019 17:38)  T(F): 98.9 (2019 08:14), Max: 99.6 (2019 17:38)  HR: 88 (2019 08:14) (80 - 88)  BP: 123/77 (2019 08:14) (115/76 - 156/89)  BP(mean): --  RR: 18 (2019 08:14) (16 - 18)  SpO2: 98% (2019 08:14) (96% - 98%)    PHYSICAL EXAM:    GENERAL: NAD, well-groomed  HEAD:  Atraumatic, Normocephalic  EYES: EOMI, PERRLA, conjunctiva and sclera clear  ENMT: No tonsillar erythema, exudates, or enlargement; Moist mucous membranes  NECK: Supple, No JVD  CHEST/LUNG: Clear to percussion bilaterally; No rales, rhonchi, wheezing, or rubs  HEART: Regular rate and rhythm; No murmurs, rubs, or gallops  ABDOMEN: Soft, Nontender, Nondistended; Bowel sounds present  EXTREMITIES:  2+ Peripheral Pulses, No clubbing, cyanosis, or edema  LYMPH: No lymphadenopathy noted  SKIN: No rashes or lesions    LABS:  CBC Full  -  ( 2019 09:13 )  WBC Count : 5.69 K/uL  RBC Count : 3.63 M/uL  Hemoglobin : 10.8 g/dL  Hematocrit : 33.0 %  Platelet Count - Automated : 261 K/uL  Mean Cell Volume : 90.9 fl  Mean Cell Hemoglobin : 29.8 pg  Mean Cell Hemoglobin Concentration : 32.7 gm/dL  Auto Neutrophil # : 2.57 K/uL  Auto Lymphocyte # : 2.44 K/uL  Auto Monocyte # : 0.54 K/uL  Auto Eosinophil # : 0.09 K/uL  Auto Basophil # : 0.04 K/uL  Auto Neutrophil % : 45.1 %  Auto Lymphocyte % : 42.9 %  Auto Monocyte % : 9.5 %  Auto Eosinophil % : 1.6 %  Auto Basophil % : 0.7 %      03    140  |  103  |  14  ----------------------------<  99  3.2<L>   |  28  |  0.55    Ca    8.7      2019 07:28    TPro  7.3  /  Alb  3.6  /  TBili  0.5  /  DBili  x   /  AST  15  /  ALT  12  /  AlkPhos  53  11-03      LIVER FUNCTIONS - ( 2019 07:28 )  Alb: 3.6 g/dL / Pro: 7.3 g/dL / ALK PHOS: 53 U/L / ALT: 12 U/L / AST: 15 U/L / GGT: x                               MICROBIOLOGY:        Urinalysis Basic - ( 2019 14:02 )    Color: Yellow / Appearance: Clear / S.023 / pH: x  Gluc: x / Ketone: Negative  / Bili: Negative / Urobili: Negative   Blood: x / Protein: Trace / Nitrite: Negative   Leuk Esterase: Negative / RBC: 5 /hpf / WBC 2 /HPF   Sq Epi: x / Non Sq Epi: 3 /hpf / Bacteria: Negative      Culture - Urine (19 @ 18:04)    Specimen Source: .Urine Clean Catch (Midstream)    Culture Results:   No growth              RADIOLOGY:    < from: Xray Tibia + Fibula 2 Views, Left (19 @ 14:21) >    FINDINGS:     Soft tissue defect within the lateral left leg with porus radiodense   foreign body, possibly packing material.    No fracture or dislocation.    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 Drainage started three weeks ago after pt removed a "strand-like protrusion" from the area. Drainage was white chalk-like initially, but now has turned yellow and has a strong odor. Ultrasound and blood work was done at [facility] on Thursday. No evidence of abscess at that time. Pt has been on augmentin since Monday 10/28. The drainage amount had dec since monday, but the smell has worsened. Pt endorses fever of 100.8 since last  and has been coming back intermittently throughout the last week. Last time she had a fever was this AM. The patient endorses warmth, swelling and sharp pain in the LLE now. Skin feels very sensistive. No nsaids today, has taken then occ throughout the week. (2019 19:13)    ER vitals:  Tm 99.6 (Rectal), P 70, /72.  No leukocytosis.  ESR 99, CRP 4.3.  UA (-) nit/LE.  Ucx no growth.  CT LLE performed.  Pt received vanco and zosyn.  ID consult called for further abx managment.        REVIEW OF SYSTEMS:    CONSTITUTIONAL: No fever, weight loss, or fatigue  EYES: No eye pain, visual disturbances, or discharge  ENMT:  No sore throat  NECK: No pain or stiffness  RESPIRATORY: No cough, wheezing, chills or hemoptysis; No shortness of breath  CARDIOVASCULAR: No chest pain, palpitations, dizziness, or leg swelling  GASTROINTESTINAL: No abdominal or epigastric pain. No nausea, vomiting, or hematemesis; No diarrhea or constipation. No melena or hematochezia.  GENITOURINARY: No dysuria, frequency, hematuria, or incontinence  NEUROLOGICAL: No headaches, memory loss, loss of strength, numbness, or tremors  SKIN: No itching, burning, rashes, or lesions   LYMPH NODES: No enlarged glands  MUSCULOSKELETAL: No joint pain or swelling; No muscle, back, or extremity pain      PAST MEDICAL & SURGICAL HISTORY:  Osteoporosis  Anxiety  Hypertension  Primary liposarcoma of soft tissue of extremity      Allergies    Compazine (Swelling)    Intolerances        FAMILY HISTORY:  No pertinent fam hx in 1st degree relatives    SOCIAL HISTORY:        MEDICATIONS  (STANDING):  diltiazem    milliGRAM(s) Oral daily  gabapentin 300 milliGRAM(s) Oral three times a day  heparin  Injectable 5000 Unit(s) SubCutaneous two times a day  losartan 100 milliGRAM(s) Oral daily  sertraline 50 milliGRAM(s) Oral daily  vancomycin  IVPB 500 milliGRAM(s) IV Intermittent every 12 hours    MEDICATIONS  (PRN):      Vital Signs Last 24 Hrs  T(C): 37.2 (2019 08:14), Max: 37.6 (2019 17:38)  T(F): 98.9 (2019 08:14), Max: 99.6 (2019 17:38)  HR: 88 (2019 08:14) (80 - 88)  BP: 123/77 (2019 08:14) (115/76 - 156/89)  BP(mean): --  RR: 18 (2019 08:14) (16 - 18)  SpO2: 98% (2019 08:14) (96% - 98%)    PHYSICAL EXAM:    GENERAL: NAD, well-groomed  HEAD:  Atraumatic, Normocephalic  EYES: EOMI, PERRLA, conjunctiva and sclera clear  ENMT: No tonsillar erythema, exudates, or enlargement; Moist mucous membranes  NECK: Supple, No JVD  CHEST/LUNG: Clear to percussion bilaterally; No rales, rhonchi, wheezing, or rubs  HEART: Regular rate and rhythm; No murmurs, rubs, or gallops  ABDOMEN: Soft, Nontender, Nondistended; Bowel sounds present  EXTREMITIES:  2+ Peripheral Pulses, No clubbing, cyanosis, or edema  LYMPH: No lymphadenopathy noted  SKIN: No rashes or lesions    LABS:  CBC Full  -  ( 2019 09:13 )  WBC Count : 5.69 K/uL  RBC Count : 3.63 M/uL  Hemoglobin : 10.8 g/dL  Hematocrit : 33.0 %  Platelet Count - Automated : 261 K/uL  Mean Cell Volume : 90.9 fl  Mean Cell Hemoglobin : 29.8 pg  Mean Cell Hemoglobin Concentration : 32.7 gm/dL  Auto Neutrophil # : 2.57 K/uL  Auto Lymphocyte # : 2.44 K/uL  Auto Monocyte # : 0.54 K/uL  Auto Eosinophil # : 0.09 K/uL  Auto Basophil # : 0.04 K/uL  Auto Neutrophil % : 45.1 %  Auto Lymphocyte % : 42.9 %  Auto Monocyte % : 9.5 %  Auto Eosinophil % : 1.6 %  Auto Basophil % : 0.7 %          140  |  103  |  14  ----------------------------<  99  3.2<L>   |  28  |  0.55    Ca    8.7      2019 07:28    TPro  7.3  /  Alb  3.6  /  TBili  0.5  /  DBili  x   /  AST  15  /  ALT  12  /  AlkPhos  53  11-      LIVER FUNCTIONS - ( 2019 07:28 )  Alb: 3.6 g/dL / Pro: 7.3 g/dL / ALK PHOS: 53 U/L / ALT: 12 U/L / AST: 15 U/L / GGT: x                               MICROBIOLOGY:        Urinalysis Basic - ( 2019 14:02 )    Color: Yellow / Appearance: Clear / S.023 / pH: x  Gluc: x / Ketone: Negative  / Bili: Negative / Urobili: Negative   Blood: x / Protein: Trace / Nitrite: Negative   Leuk Esterase: Negative / RBC: 5 /hpf / WBC 2 /HPF   Sq Epi: x / Non Sq Epi: 3 /hpf / Bacteria: Negative      Culture - Urine (19 @ 18:04)    Specimen Source: .Urine Clean Catch (Midstream)    Culture Results:   No growth              RADIOLOGY:    < from: Xray Tibia + Fibula 2 Views, Left (19 @ 14:21) >    FINDINGS:     Soft tissue defect within the lateral left leg with porus radiodense   foreign body, possibly packing material.    No fracture or dislocation.    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 Drainage started three weeks ago after pt removed a "strand-like protrusion" from the area. Drainage was white chalk-like initially, but now has turned yellow and has a strong odor. Ultrasound and blood work was done at [facility] on Thursday. No evidence of abscess at that time. Pt has been on augmentin since Monday 10/28. The drainage amount had dec since monday, but the smell has worsened. Pt endorses fever of 100.8 since last  and has been coming back intermittently throughout the last week. Last time she had a fever was this AM. The patient endorses warmth, swelling and sharp pain in the LLE now. Skin feels very sensistive. No nsaids today, has taken then occ throughout the week. (2019 19:13)    Pt states liposarcoma resection and skin graft performed in the .  Pt had worn a leg brace at that time, which caused a permanent scar and indentation on the lower aspect of graft site.  She denies any prior opening of the wound.  Now with opening of small hole at the level of the scar with thick yellowish drainage, and surrounding swelling, tenderness and erythema.  Pt saw her PCP last Thursday, pt states fevers improved while on Augmentin, however continued to have drainage and swelling.      ER vitals:  Tm 99.6 (Rectal), P 70, /72.  No leukocytosis.  ESR 99, CRP 4.3.  UA (-) nit/LE.  Ucx no growth.  CT LLE performed.  Pt received vanco and zosyn.  ID consult called for further abx managment.        REVIEW OF SYSTEMS:    CONSTITUTIONAL: No fever, weight loss, or fatigue  EYES: No eye pain, visual disturbances, or discharge  ENMT:  No sore throat  NECK: No pain or stiffness  RESPIRATORY: No cough, wheezing, chills or hemoptysis; No shortness of breath  CARDIOVASCULAR: No chest pain, palpitations, dizziness, or leg swelling  GASTROINTESTINAL: No abdominal or epigastric pain. No nausea, vomiting, or hematemesis; No diarrhea or constipation. No melena or hematochezia.  GENITOURINARY: No dysuria, frequency, hematuria, or incontinence  NEUROLOGICAL: No headaches, memory loss, loss of strength, numbness, or tremors  SKIN: No itching, burning, rashes, or lesions   LYMPH NODES: No enlarged glands  MUSCULOSKELETAL: No joint pain or swelling; No muscle, back, or extremity pain      PAST MEDICAL & SURGICAL HISTORY:  Osteoporosis  Anxiety  Hypertension  Primary liposarcoma of soft tissue of extremity      Allergies    Compazine (Swelling)    Intolerances        FAMILY HISTORY:  No pertinent fam hx in 1st degree relatives    SOCIAL HISTORY:    Denies smoking, ivdu, etoh    MEDICATIONS  (STANDING):  diltiazem    milliGRAM(s) Oral daily  gabapentin 300 milliGRAM(s) Oral three times a day  heparin  Injectable 5000 Unit(s) SubCutaneous two times a day  losartan 100 milliGRAM(s) Oral daily  sertraline 50 milliGRAM(s) Oral daily  vancomycin  IVPB 500 milliGRAM(s) IV Intermittent every 12 hours    MEDICATIONS  (PRN):      Vital Signs Last 24 Hrs  T(C): 37.2 (2019 08:14), Max: 37.6 (2019 17:38)  T(F): 98.9 (2019 08:14), Max: 99.6 (2019 17:38)  HR: 88 (2019 08:14) (80 - 88)  BP: 123/77 (2019 08:14) (115/76 - 156/89)  BP(mean): --  RR: 18 (2019 08:14) (16 - 18)  SpO2: 98% (2019 08:14) (96% - 98%)    PHYSICAL EXAM:    GENERAL: NAD, well-groomed  HEAD:  Atraumatic, Normocephalic  EYES: EOMI, PERRLA, conjunctiva and sclera clear  ENMT: No tonsillar erythema, exudates, or enlargement; Moist mucous membranes  NECK: Supple, No JVD  CHEST/LUNG: Clear to percussion bilaterally; No rales, rhonchi, wheezing, or rubs  HEART: Regular rate and rhythm; No murmurs, rubs, or gallops  ABDOMEN: Soft, Nontender, Nondistended; Bowel sounds present  EXTREMITIES:  2+ Peripheral Pulses, LLE healed skin graft site.  Lower border with chronic appearing defect, hole-like lesion with yellowish drainage on gauze.  Surrounding swelling with erythema  LYMPH: No lymphadenopathy noted  SKIN: No rashes     LABS:  CBC Full  -  ( 2019 09:13 )  WBC Count : 5.69 K/uL  RBC Count : 3.63 M/uL  Hemoglobin : 10.8 g/dL  Hematocrit : 33.0 %  Platelet Count - Automated : 261 K/uL  Mean Cell Volume : 90.9 fl  Mean Cell Hemoglobin : 29.8 pg  Mean Cell Hemoglobin Concentration : 32.7 gm/dL  Auto Neutrophil # : 2.57 K/uL  Auto Lymphocyte # : 2.44 K/uL  Auto Monocyte # : 0.54 K/uL  Auto Eosinophil # : 0.09 K/uL  Auto Basophil # : 0.04 K/uL  Auto Neutrophil % : 45.1 %  Auto Lymphocyte % : 42.9 %  Auto Monocyte % : 9.5 %  Auto Eosinophil % : 1.6 %  Auto Basophil % : 0.7 %          140  |  103  |  14  ----------------------------<  99  3.2<L>   |  28  |  0.55    Ca    8.7      2019 07:28    TPro  7.3  /  Alb  3.6  /  TBili  0.5  /  DBili  x   /  AST  15  /  ALT  12  /  AlkPhos  53  11-03      LIVER FUNCTIONS - ( 2019 07:28 )  Alb: 3.6 g/dL / Pro: 7.3 g/dL / ALK PHOS: 53 U/L / ALT: 12 U/L / AST: 15 U/L / GGT: x                               MICROBIOLOGY:        Urinalysis Basic - ( 2019 14:02 )    Color: Yellow / Appearance: Clear / S.023 / pH: x  Gluc: x / Ketone: Negative  / Bili: Negative / Urobili: Negative   Blood: x / Protein: Trace / Nitrite: Negative   Leuk Esterase: Negative / RBC: 5 /hpf / WBC 2 /HPF   Sq Epi: x / Non Sq Epi: 3 /hpf / Bacteria: Negative      Culture - Urine (19 @ 18:04)    Specimen Source: .Urine Clean Catch (Midstream)    Culture Results:   No growth              RADIOLOGY:    < from: Xray Tibia + Fibula 2 Views, Left (19 @ 14:21) >    FINDINGS:     Soft tissue defect within the lateral left leg with porus radiodense   foreign body, possibly packing material.    No fracture or dislocation.    < end of copied text >        < from: CT Lower Extremity w/ IV Cont, Left (19 @ 14:08) >  FINDINGS:    There is a large open wound at the lateral aspect of the lower leg at the   level of the proximal third of the fibula laterally. This wound is filled   with radiopaque material at its base with overlying and adjacent soft   tissue swelling. There is no peripherally enhancing encapsulated fluid  collection to suggest the presence of abscess.    There is fatty atrophy of the anterior musculature throughout the left   lower leg likely secondary to denervation.    There is a speckled appearing lesion within the medullary cavity of the   lateral femoral condyle likely consistent with an enchondroma. There are   no aggressive features identified.    There are no CT findings of osteomyelitis.    IMPRESSION:    Large open wound filled with radiopaque material at the lateral aspect of   the lower leg at the level of the proximal third of the fibula with   prominent adjacent soft tissue swelling but without evidence of a   distinctly encapsulated peripherally enhancing fluid collection to   suggest an encapsulated abscess. No CT evidence ofosteomyelitis.    < end of copied text >

## 2019-11-03 NOTE — PROGRESS NOTE ADULT - SUBJECTIVE AND OBJECTIVE BOX
Patient is a 69y old  Female who presents with a chief complaint of Leg wound infection (2019 14:42)      SUBJECTIVE / OVERNIGHT EVENTS:  No new symptoms  Review of Systems:   CONSTITUTIONAL: No fever, weight loss, or fatigue  EYES: No eye pain, visual disturbances, or discharge  ENMT:  No difficulty hearing, tinnitus, vertigo; No sinus or throat pain  NECK: No pain or stiffness  BREASTS: No pain, masses, or nipple discharge  RESPIRATORY: No cough, wheezing, chills or hemoptysis; No shortness of breath  CARDIOVASCULAR: No chest pain, palpitations, dizziness, or leg swelling  GASTROINTESTINAL: No abdominal or epigastric pain. No nausea, vomiting, or hematemesis; No diarrhea or constipation. No melena or hematochezia.  GENITOURINARY: No dysuria, frequency, hematuria, or incontinence  NEUROLOGICAL: No headaches, memory loss, loss of strength, numbness, or tremors  SKIN: No itching, burning, rashes, or lesions   LYMPH NODES: No enlarged glands  ENDOCRINE: No heat or cold intolerance; No hair loss  MUSCULOSKELETAL: No joint pain or swelling; No muscle, back, or extremity pain  PSYCHIATRIC: No depression, anxiety, mood swings, or difficulty sleeping  HEME/LYMPH: No easy bruising, or bleeding gums  ALLERY AND IMMUNOLOGIC: No hives or eczema    MEDICATIONS  (STANDING):  diltiazem    milliGRAM(s) Oral daily  gabapentin 300 milliGRAM(s) Oral three times a day  heparin  Injectable 5000 Unit(s) SubCutaneous two times a day  losartan 100 milliGRAM(s) Oral daily  piperacillin/tazobactam IVPB.. 3.375 Gram(s) IV Intermittent every 8 hours  sertraline 50 milliGRAM(s) Oral daily  vancomycin  IVPB 500 milliGRAM(s) IV Intermittent every 12 hours    MEDICATIONS  (PRN):      PHYSICAL EXAM:  Vital Signs Last 24 Hrs  T(C): 37.4 (2019 01:01), Max: 37.4 (2019 01:01)  T(F): 99.3 (2019 01:), Max: 99.3 (2019 01:)  HR: 79 (2019 01:01) (77 - 88)  BP: 120/77 (2019 01:01) (120/77 - 144/86)  BP(mean): --  RR: 18 (2019 01:01) (18 - 18)  SpO2: 97% (2019 01:01) (97% - 98%)  I&O's Summary    2019 07:01  -  2019 06:32  --------------------------------------------------------  IN: 200 mL / OUT: 0 mL / NET: 200 mL      GENERAL: NAD, well-developed  HEAD:  Atraumatic, Normocephalic  EYES: EOMI, PERRLA, conjunctiva and sclera clear  NECK: Supple, No JVD  CHEST/LUNG: Clear to auscultation bilaterally; No wheeze  HEART: Regular rate and rhythm; No murmurs, rubs, or gallops  ABDOMEN: Soft, Nontender, Nondistended; Bowel sounds present  EXTREMITIES:  2+ Peripheral Pulses, No clubbing, cyanosis, or edema  PSYCH: AAOx3  NEUROLOGY: non-focal  SKIN: Right leg with an ulcer posteriorly, mild drainage noted    LABS:  CAPILLARY BLOOD GLUCOSE                              10.8   5.69  )-----------( 261      ( 2019 09:13 )             33.0     11-03    140  |  103  |  14  ----------------------------<  99  3.2<L>   |  28  |  0.55    Ca    8.7      2019 07:28    TPro  7.3  /  Alb  3.6  /  TBili  0.5  /  DBili  x   /  AST  15  /  ALT  12  /  AlkPhos  53  11-03    PT/INR - ( 2019 13:03 )   PT: 13.5 sec;   INR: 1.17 ratio         PTT - ( 2019 13:03 )  PTT:29.8 sec      Urinalysis Basic - ( 2019 14:02 )    Color: Yellow / Appearance: Clear / S.023 / pH: x  Gluc: x / Ketone: Negative  / Bili: Negative / Urobili: Negative   Blood: x / Protein: Trace / Nitrite: Negative   Leuk Esterase: Negative / RBC: 5 /hpf / WBC 2 /HPF   Sq Epi: x / Non Sq Epi: 3 /hpf / Bacteria: Negative        RADIOLOGY & ADDITIONAL TESTS:    Imaging Personally Reviewed:    Consultant(s) Notes Reviewed:      Care Discussed with Consultants/Other Providers:

## 2019-11-03 NOTE — CONSULT NOTE ADULT - ASSESSMENT
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 Drainage started three weeks ago after pt removed a "strand-like protrusion" from the area. Drainage was white chalk-like initially, but now has turned yellow and has a strong odor. Ultrasound and blood work was done at [facility] on Thursday. No evidence of abscess at that time. Pt has been on augmentin since Monday 10/28. The drainage amount had dec since monday, but the smell has worsened. Pt endorses fever of 100.8 since last Sunday and has been coming back intermittently throughout the last week. Last time she had a fever was this AM. The patient endorses warmth, swelling and sharp pain in the LLE now. Skin feels very sensistive. No nsaids today, has taken then occ throughout the week. (02 Nov 2019 19:13)    Pt states liposarcoma resection and skin graft performed in the 1990s.  Pt had worn a leg brace at that time, which caused a permanent scar and indentation on the lower aspect of graft site.  She denies any prior opening of the wound.  Now with opening of small hole at the level of the scar with thick yellowish drainage, and surrounding swelling, tenderness and erythema.  Pt saw her PCP last Thursday, pt states fevers improved while on Augmentin, however continued to have drainage and swelling.      ER vitals:  Tm 99.6 (Rectal), P 70, /72.  No leukocytosis.  ESR 99, CRP 4.3.  UA (-) nit/LE.  Ucx no growth.  CT LLE performed.  Pt received vanco and zosyn.  ID consult called for further abx managment. 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 Drainage started three weeks ago after pt removed a "strand-like protrusion" from the area. Drainage was white chalk-like initially, but now has turned yellow and has a strong odor. Ultrasound and blood work was done at [facility] on Thursday. No evidence of abscess at that time. Pt has been on augmentin since Monday 10/28. The drainage amount had dec since monday, but the smell has worsened. Pt endorses fever of 100.8 since last Sunday and has been coming back intermittently throughout the last week. Last time she had a fever was this AM. The patient endorses warmth, swelling and sharp pain in the LLE now. Skin feels very sensistive. No nsaids today, has taken then occ throughout the week. (02 Nov 2019 19:13)    Pt states liposarcoma resection and skin graft performed in the 1990s.  Pt had worn a leg brace at that time, which caused a permanent scar and indentation on the lower aspect of graft site.  She denies any prior opening of the wound.  Now with opening of small hole at the level of the scar with thick yellowish drainage, and surrounding swelling, tenderness and erythema.  Pt saw her PCP last Thursday, pt states fevers improved while on Augmentin, however continued to have drainage and swelling.      ER vitals:  Tm 99.6 (Rectal), P 70, /72.  No leukocytosis.  ESR 99, CRP 4.3.  UA (-) nit/LE.  Ucx no growth.  CT LLE performed.  Pt received vanco and zosyn.  ID consult called for further abx managment.     LLE wound infection:    - Pt with open wound with yellow discharge, low grade fever reported at home.  (+) surrounding erythema and tenderness.  CT imaging without drainable collection or OM.      - Recommend continuing broad spectrum abx.  Cont both vancomycin and will resume zosyn.  f/u wound cultures.  Monitor for clinical improvement.     - f/u blood cultures.     - Recommend wound care evaluation    - Check vanco trough prior to 4th dose.       Will follow,    Muna Romero  [phone]

## 2019-11-04 ENCOUNTER — RESULT REVIEW (OUTPATIENT)
Age: 69
End: 2019-11-04

## 2019-11-04 ENCOUNTER — TRANSCRIPTION ENCOUNTER (OUTPATIENT)
Age: 69
End: 2019-11-04

## 2019-11-04 LAB
ANION GAP SERPL CALC-SCNC: 8 MMOL/L — SIGNIFICANT CHANGE UP (ref 5–17)
BUN SERPL-MCNC: 12 MG/DL — SIGNIFICANT CHANGE UP (ref 7–23)
CALCIUM SERPL-MCNC: 8.6 MG/DL — SIGNIFICANT CHANGE UP (ref 8.4–10.5)
CHLORIDE SERPL-SCNC: 105 MMOL/L — SIGNIFICANT CHANGE UP (ref 96–108)
CO2 SERPL-SCNC: 25 MMOL/L — SIGNIFICANT CHANGE UP (ref 22–31)
CREAT SERPL-MCNC: 0.59 MG/DL — SIGNIFICANT CHANGE UP (ref 0.5–1.3)
GLUCOSE SERPL-MCNC: 93 MG/DL — SIGNIFICANT CHANGE UP (ref 70–99)
POTASSIUM SERPL-MCNC: 4.1 MMOL/L — SIGNIFICANT CHANGE UP (ref 3.5–5.3)
POTASSIUM SERPL-SCNC: 4.1 MMOL/L — SIGNIFICANT CHANGE UP (ref 3.5–5.3)
SODIUM SERPL-SCNC: 138 MMOL/L — SIGNIFICANT CHANGE UP (ref 135–145)
VANCOMYCIN TROUGH SERPL-MCNC: <4 UG/ML — LOW (ref 10–20)

## 2019-11-04 PROCEDURE — 88304 TISSUE EXAM BY PATHOLOGIST: CPT | Mod: 26

## 2019-11-04 PROCEDURE — 99232 SBSQ HOSP IP/OBS MODERATE 35: CPT

## 2019-11-04 RX ORDER — CHLORHEXIDINE GLUCONATE 213 G/1000ML
1 SOLUTION TOPICAL
Refills: 0 | Status: DISCONTINUED | OUTPATIENT
Start: 2019-11-04 | End: 2019-11-06

## 2019-11-04 RX ADMIN — Medication 100 MILLIGRAM(S): at 02:44

## 2019-11-04 RX ADMIN — PIPERACILLIN AND TAZOBACTAM 25 GRAM(S): 4; .5 INJECTION, POWDER, LYOPHILIZED, FOR SOLUTION INTRAVENOUS at 21:44

## 2019-11-04 RX ADMIN — SERTRALINE 50 MILLIGRAM(S): 25 TABLET, FILM COATED ORAL at 11:46

## 2019-11-04 RX ADMIN — PIPERACILLIN AND TAZOBACTAM 25 GRAM(S): 4; .5 INJECTION, POWDER, LYOPHILIZED, FOR SOLUTION INTRAVENOUS at 05:25

## 2019-11-04 RX ADMIN — GABAPENTIN 300 MILLIGRAM(S): 400 CAPSULE ORAL at 05:24

## 2019-11-04 RX ADMIN — HEPARIN SODIUM 5000 UNIT(S): 5000 INJECTION INTRAVENOUS; SUBCUTANEOUS at 05:24

## 2019-11-04 RX ADMIN — LOSARTAN POTASSIUM 100 MILLIGRAM(S): 100 TABLET, FILM COATED ORAL at 05:24

## 2019-11-04 RX ADMIN — Medication 100 MILLIGRAM(S): at 13:46

## 2019-11-04 RX ADMIN — Medication 120 MILLIGRAM(S): at 21:44

## 2019-11-04 RX ADMIN — GABAPENTIN 300 MILLIGRAM(S): 400 CAPSULE ORAL at 21:44

## 2019-11-04 RX ADMIN — PIPERACILLIN AND TAZOBACTAM 25 GRAM(S): 4; .5 INJECTION, POWDER, LYOPHILIZED, FOR SOLUTION INTRAVENOUS at 15:03

## 2019-11-04 RX ADMIN — GABAPENTIN 300 MILLIGRAM(S): 400 CAPSULE ORAL at 13:45

## 2019-11-04 RX ADMIN — HEPARIN SODIUM 5000 UNIT(S): 5000 INJECTION INTRAVENOUS; SUBCUTANEOUS at 17:15

## 2019-11-04 NOTE — DISCHARGE NOTE PROVIDER - NSDCFUADDAPPT_GEN_ALL_CORE_FT
Upon discharge f/u as outpatient at [facility] [address] [facility] [phone]  Seen with Dr. Bland. Discussed with clinical nurse

## 2019-11-04 NOTE — PROGRESS NOTE ADULT - SUBJECTIVE AND OBJECTIVE BOX
Patient is a 69y old  Female who presents with a chief complaint of Leg wound infection (2019 14:42)      SUBJECTIVE / OVERNIGHT EVENTS:  No new symptoms. Low grade fever reported  Review of Systems:   CONSTITUTIONAL: No fever, weight loss, or fatigue  EYES: No eye pain, visual disturbances, or discharge  ENMT:  No difficulty hearing, tinnitus, vertigo; No sinus or throat pain  NECK: No pain or stiffness  BREASTS: No pain, masses, or nipple discharge  RESPIRATORY: No cough, wheezing, chills or hemoptysis; No shortness of breath  CARDIOVASCULAR: No chest pain, palpitations, dizziness, or leg swelling  GASTROINTESTINAL: No abdominal or epigastric pain. No nausea, vomiting, or hematemesis; No diarrhea or constipation. No melena or hematochezia.  GENITOURINARY: No dysuria, frequency, hematuria, or incontinence  NEUROLOGICAL: No headaches, memory loss, loss of strength, numbness, or tremors  SKIN: No itching, burning, rashes, or lesions   LYMPH NODES: No enlarged glands  ENDOCRINE: No heat or cold intolerance; No hair loss  MUSCULOSKELETAL: No joint pain or swelling; No muscle, back, or extremity pain  PSYCHIATRIC: No depression, anxiety, mood swings, or difficulty sleeping  HEME/LYMPH: No easy bruising, or bleeding gums  ALLERY AND IMMUNOLOGIC: No hives or eczema    MEDICATIONS  (STANDING):  diltiazem    milliGRAM(s) Oral daily  gabapentin 300 milliGRAM(s) Oral three times a day  heparin  Injectable 5000 Unit(s) SubCutaneous two times a day  losartan 100 milliGRAM(s) Oral daily  piperacillin/tazobactam IVPB.. 3.375 Gram(s) IV Intermittent every 8 hours  sertraline 50 milliGRAM(s) Oral daily  vancomycin  IVPB 500 milliGRAM(s) IV Intermittent every 12 hours    MEDICATIONS  (PRN):      PHYSICAL EXAM:  Vital Signs Last 24 Hrs  T(C): 37.4 (2019 01:01), Max: 37.4 (2019 01:01)  T(F): 99.3 (2019 01:01), Max: 99.3 (2019 01:01)  HR: 79 (2019 01:01) (77 - 88)  BP: 120/77 (2019 01:01) (120/77 - 144/86)  BP(mean): --  RR: 18 (2019 01:01) (18 - 18)  SpO2: 97% (2019 01:01) (97% - 98%)  I&O's Summary    2019 07:01  -  2019 06:32  --------------------------------------------------------  IN: 200 mL / OUT: 0 mL / NET: 200 mL      GENERAL: NAD, well-developed  HEAD:  Atraumatic, Normocephalic  EYES: EOMI, PERRLA, conjunctiva and sclera clear  NECK: Supple, No JVD  CHEST/LUNG: Clear to auscultation bilaterally; No wheeze  HEART: Regular rate and rhythm; No murmurs, rubs, or gallops  ABDOMEN: Soft, Nontender, Nondistended; Bowel sounds present  EXTREMITIES:  2+ Peripheral Pulses, No clubbing, cyanosis, or edema  PSYCH: AAOx3  NEUROLOGY: non-focal  SKIN: Right leg with an ulcer posteriorly, mild drainage noted    LABS:  CAPILLARY BLOOD GLUCOSE                              10.8   5.69  )-----------( 261      ( 2019 09:13 )             33.0     11-03    140  |  103  |  14  ----------------------------<  99  3.2<L>   |  28  |  0.55    Ca    8.7      2019 07:28    TPro  7.3  /  Alb  3.6  /  TBili  0.5  /  DBili  x   /  AST  15  /  ALT  12  /  AlkPhos  53  11-03    PT/INR - ( 2019 13:03 )   PT: 13.5 sec;   INR: 1.17 ratio         PTT - ( 2019 13:03 )  PTT:29.8 sec      Urinalysis Basic - ( 2019 14:02 )    Color: Yellow / Appearance: Clear / S.023 / pH: x  Gluc: x / Ketone: Negative  / Bili: Negative / Urobili: Negative   Blood: x / Protein: Trace / Nitrite: Negative   Leuk Esterase: Negative / RBC: 5 /hpf / WBC 2 /HPF   Sq Epi: x / Non Sq Epi: 3 /hpf / Bacteria: Negative        RADIOLOGY & ADDITIONAL TESTS:    Imaging Personally Reviewed:    Consultant(s) Notes Reviewed:      Care Discussed with Consultants/Other Providers:

## 2019-11-04 NOTE — DISCHARGE NOTE PROVIDER - CARE PROVIDERS DIRECT ADDRESSES
[email or web address]

## 2019-11-04 NOTE — PROGRESS NOTE ADULT - SUBJECTIVE AND OBJECTIVE BOX
Infectious Diseases progress note:    Subjective: Feels better, leg is less painful/tender, less swelling and erythema improved.  Pt seen by wound care, packing placed.  Wound cx (+) GNR.    ROS:  CONSTITUTIONAL:  No fever, chills, rigors  CARDIOVASCULAR:  No chest pain or palpitations  RESPIRATORY:   No SOB, cough, dyspnea on exertion.  No wheezing  GASTROINTESTINAL:  No abd pain, N/V, diarrhea/constipation  EXTREMITIES:  No swelling or joint pain  GENITOURINARY:  No burning on urination, increased frequency or urgency.  No flank pain  NEUROLOGIC:  No HA, visual disturbances  SKIN: No rashes    Allergies    Compazine (Swelling)    Intolerances        ANTIBIOTICS/RELEVANT:  antimicrobials  piperacillin/tazobactam IVPB.. 3.375 Gram(s) IV Intermittent every 8 hours  vancomycin  IVPB 500 milliGRAM(s) IV Intermittent every 12 hours    immunologic:    OTHER:  diltiazem    milliGRAM(s) Oral daily  gabapentin 300 milliGRAM(s) Oral three times a day  heparin  Injectable 5000 Unit(s) SubCutaneous two times a day  losartan 100 milliGRAM(s) Oral daily  sertraline 50 milliGRAM(s) Oral daily      Objective:  Vital Signs Last 24 Hrs  T(C): 36.8 (04 Nov 2019 08:47), Max: 37.4 (04 Nov 2019 01:01)  T(F): 98.3 (04 Nov 2019 08:47), Max: 99.3 (04 Nov 2019 01:01)  HR: 76 (04 Nov 2019 08:47) (76 - 79)  BP: 145/85 (04 Nov 2019 08:47) (120/77 - 145/85)  BP(mean): --  RR: 18 (04 Nov 2019 08:47) (18 - 18)  SpO2: 96% (04 Nov 2019 08:47) (96% - 97%)    PHYSICAL EXAM:  Constitutional:NAD  Eyes:WESLEY, EOMI  Ear/Nose/Throat: no thrush, mucositis.  Moist mucous membranes	  Neck:no JVD, no lymphadenopathy, supple  Respiratory: CTA esvin  Cardiovascular: S1S2 RRR, no murmurs  Gastrointestinal:soft, nontender,  nondistended (+) BS  Extremities:no e/e/c  Skin:  LLE wound with packing, decreased erythema/swelling        LABS:                        10.8   5.69  )-----------( 261      ( 03 Nov 2019 09:13 )             33.0     11-04    138  |  105  |  12  ----------------------------<  93  4.1   |  25  |  0.59    Ca    8.6      04 Nov 2019 07:14    TPro  7.3  /  Alb  3.6  /  TBili  0.5  /  DBili  x   /  AST  15  /  ALT  12  /  AlkPhos  53  11-03                Vancomycin Level, Trough: <4.0 ug/mL (11-04 @ 13:16)              MICROBIOLOGY:    Culture - Surgical Swab (11.03.19 @ 17:51)    Specimen Source: .Surgical Swab Other    Culture Results:   Moderate Gram Negative Rods Identification and susceptibility to follow.  Moderate Pseudomonas aeruginosa Susceptibility to follow.  Moderate Citrobacter koseri Susceptibility to follow.    Culture - Urine (11.02.19 @ 18:04)    Specimen Source: .Urine Clean Catch (Midstream)    Culture Results:   No growth    Culture - Blood (11.02.19 @ 17:45)    Specimen Source: .Blood Blood-Peripheral    Culture Results:   No growth to date.    Culture - Blood (11.02.19 @ 17:45)    Specimen Source: .Blood Blood-Peripheral    Culture Results:   No growth to date.              RADIOLOGY & ADDITIONAL STUDIES:

## 2019-11-04 NOTE — CONSULT NOTE ADULT - ATTENDING COMMENTS
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

## 2019-11-04 NOTE — CONSULT NOTE ADULT - ASSESSMENT
Impression:    Left lateral lower leg wound    Recommend:  1.) topical therapy: left lateral lower leg wound- irrigate with NS, pat dry, pack with 1/4" iodoform strip packing, cover with DSD, secure with tegederm  2.) Abx per Medicine/ ID  3.) Nutrition optimization  4.) follow up pathology results    Care as per medicine will follow w/ you  Upon discharge f/u as outpatient at [facility] [address] [place] [address] [phone]  Seen with Dr. Bland. Discussed with clinical nurse  Thank you for this consult  Darcy Morton, NP-C, CWOCN [id number]

## 2019-11-04 NOTE — DISCHARGE NOTE PROVIDER - NSDCFUADDINST_GEN_ALL_CORE_FT
Wound Care: topical therapy: left lateral lower leg wound- irrigate with NS, pat dry, pack with 1/4" iodoform strip packing, cover with DSD, secure with tegaderm Wound Care: topical therapy: left lateral lower leg wound- irrigate with NS, pat dry, pack with 1/4" iodoform strip packing, cover with DSD, secure with tegaderm.   As per Wound Care - able to take shower, but have dressing change after.

## 2019-11-04 NOTE — DISCHARGE NOTE PROVIDER - HOSPITAL COURSE
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 Drainage started three weeks ago after pt removed a "strand-like protrusion" from the area. Drainage was white chalk-like initially, but now has turned yellow and has a strong odor. Ultrasound and blood work was done at [facility] on Thursday. No evidence of abscess at that time. Pt has been on augmentin since Monday 10/28. The drainage amount had dec since monday, but the smell has worsened. Pt endorses fever of 100.8 since last Sunday and has been coming back intermittently throughout the last week. Pt admitted for LLE cellulitis.   .  CT imaging without drainable collection or OM. ID on board- recommended to start broad spectrum abx - vanco/zosyn.  Wound cultures growing pseudomonas and citrobacter, and today (11/5) also enterobacter and staph lugdunesnsis. Blood cultures, NGTD. Pt had wound care evaluation- s/p pscking , and specimen tooked for pathology. Pt to be discharge on oral abx (doxy and levaquin as per ID - Dr. Romero. Pt stable for discharge and to follow up with her PCP, Woundcare, and ID.

## 2019-11-04 NOTE — DISCHARGE NOTE PROVIDER - NSDCMRMEDTOKEN_GEN_ALL_CORE_FT
dilTIAZem 120 mg oral tablet: 1 tab(s) orally once a day  gabapentin 300 mg oral capsule: 1 cap(s) orally once a day  losartan-hydrochlorothiazide 100 mg-25 mg oral tablet: 1 tab(s) orally once a day  sertraline 50 mg oral tablet: 1 tab(s) orally once a day candesartan 32 mg oral tablet: 1 tab(s) orally once a day   dilTIAZem 120 mg oral tablet: 1 tab(s) orally once a day  doxycycline hyclate 100 mg oral capsule: 1 cap(s) orally 2 times a day   gabapentin 300 mg oral capsule: 1 cap(s) orally 3 times a day  Levaquin 500 mg oral tablet: 1 tab(s) orally every 24 hours   Ms. Ashley was admitted from 11/2/19 to 11/6/19 to [facility] :   sertraline 50 mg oral tablet: 1 tab(s) orally once a day candesartan 32 mg oral tablet: 1 tab(s) orally once a day   dilTIAZem 120 mg oral tablet: 1 tab(s) orally once a day  doxycycline hyclate 100 mg oral capsule: 1 cap(s) orally 2 times a day   gabapentin 300 mg oral capsule: 1 cap(s) orally 3 times a day  Levaquin 500 mg oral tablet: 1 tab(s) orally every 24 hours   Ms. Ashley was admitted from 11/2/19 to 11/6/19 to [facility] :   sertraline 50 mg oral tablet: 1 tab(s) orally once a day

## 2019-11-04 NOTE — DISCHARGE NOTE PROVIDER - CARE PROVIDER_API CALL
Irvin Bland (MD)  Surgery  [address] [facility]  [address] [facility], [address]  [place] [address]  Phone: [phone]  Fax: [phone]  Follow Up Time:     Muna Romero)  Infectious Disease; Internal Medicine  [address] [facility] [address]  [place] [address]  Phone: [phone]  Fax: [phone]  Follow Up Time:

## 2019-11-04 NOTE — PROGRESS NOTE ADULT - PROBLEM SELECTOR PLAN 1
Failed out patient PO Augmentin. Left leg has an open wound. CT with a radio opaque area, unknown etiology. ID and wound care assessment noted. On zosyn fr Pseudomonas and citrobacter in cultures from wound.

## 2019-11-04 NOTE — PROGRESS NOTE ADULT - ASSESSMENT
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 Drainage started three weeks ago after pt removed a "strand-like protrusion" from the area. Drainage was white chalk-like initially, but now has turned yellow and has a strong odor. Ultrasound and blood work was done at [facility] on Thursday. No evidence of abscess at that time. Pt has been on augmentin since Monday 10/28. The drainage amount had dec since monday, but the smell has worsened. Pt endorses fever of 100.8 since last Sunday and has been coming back intermittently throughout the last week. Last time she had a fever was this AM. The patient endorses warmth, swelling and sharp pain in the LLE now. Skin feels very sensistive. No nsaids today, has taken then occ throughout the week. (02 Nov 2019 19:13)    Pt states liposarcoma resection and skin graft performed in the 1990s.  Pt had worn a leg brace at that time, which caused a permanent scar and indentation on the lower aspect of graft site.  She denies any prior opening of the wound.  Now with opening of small hole at the level of the scar with thick yellowish drainage, and surrounding swelling, tenderness and erythema.  Pt saw her PCP last Thursday, pt states fevers improved while on Augmentin, however continued to have drainage and swelling.      ER vitals:  Tm 99.6 (Rectal), P 70, /72.  No leukocytosis.  ESR 99, CRP 4.3.  UA (-) nit/LE.  Ucx no growth.  CT LLE performed.  Pt received vanco and zosyn.  ID consult called for further abx managment.     LLE wound infection:    - Pt with open wound with yellow discharge, low grade fever reported at home.  (+) surrounding erythema and tenderness.  CT imaging without drainable collection or OM.      - Recommend continuing broad spectrum abx.  Cont  zosyn.  Wound cultures growing pseudomonas and citrobacter.  D/c vancomycin.  Monitor for clinical improvement.     - f/u blood cultures.     - f/u Wound care evaluation.  s/p packing placed today.  Leg with decreased drainage, erythema and swelling today.          Will follow,    Muna Romero  [phone]

## 2019-11-04 NOTE — DISCHARGE NOTE PROVIDER - NSDCCPCAREPLAN_GEN_ALL_CORE_FT
PRINCIPAL DISCHARGE DIAGNOSIS  Diagnosis: Cellulitis  Assessment and Plan of Treatment: Take all of your antibiotics as ordered.  Call your Health Care Provider within two days of arriving home to make a follow up appointment within one week.  If the affected cellulitic area increases in redness, warmth, pain or swelling call your Health Care Provider.  If you develop fever, chills, and/or malaise, call your Health Care Provider.         SECONDARY DISCHARGE DIAGNOSES  Diagnosis: Benign essential hypertension  Assessment and Plan of Treatment: Low salt diet  Activity as tolerated.  Take all medication as prescribed.  Follow up with your medical doctor for routine blood pressure monitoring at your next visit.  Notify your doctor if you have any of the following symptoms:   Dizziness, Lightheadedness, Blurry vision, Headache, Chest pain, Shortness of breath

## 2019-11-05 LAB
-  AMIKACIN: SIGNIFICANT CHANGE UP
-  AMIKACIN: SIGNIFICANT CHANGE UP
-  AMPICILLIN/SULBACTAM: SIGNIFICANT CHANGE UP
-  AMPICILLIN: SIGNIFICANT CHANGE UP
-  AZTREONAM: SIGNIFICANT CHANGE UP
-  AZTREONAM: SIGNIFICANT CHANGE UP
-  CEFAZOLIN: SIGNIFICANT CHANGE UP
-  CEFEPIME: SIGNIFICANT CHANGE UP
-  CEFEPIME: SIGNIFICANT CHANGE UP
-  CEFOXITIN: SIGNIFICANT CHANGE UP
-  CEFTAZIDIME: SIGNIFICANT CHANGE UP
-  CEFTRIAXONE: SIGNIFICANT CHANGE UP
-  CIPROFLOXACIN: SIGNIFICANT CHANGE UP
-  CIPROFLOXACIN: SIGNIFICANT CHANGE UP
-  ERTAPENEM: SIGNIFICANT CHANGE UP
-  GENTAMICIN: SIGNIFICANT CHANGE UP
-  GENTAMICIN: SIGNIFICANT CHANGE UP
-  IMIPENEM: SIGNIFICANT CHANGE UP
-  IMIPENEM: SIGNIFICANT CHANGE UP
-  LEVOFLOXACIN: SIGNIFICANT CHANGE UP
-  LEVOFLOXACIN: SIGNIFICANT CHANGE UP
-  MEROPENEM: SIGNIFICANT CHANGE UP
-  MEROPENEM: SIGNIFICANT CHANGE UP
-  PIPERACILLIN/TAZOBACTAM: SIGNIFICANT CHANGE UP
-  PIPERACILLIN/TAZOBACTAM: SIGNIFICANT CHANGE UP
-  TOBRAMYCIN: SIGNIFICANT CHANGE UP
-  TOBRAMYCIN: SIGNIFICANT CHANGE UP
-  TRIMETHOPRIM/SULFAMETHOXAZOLE: SIGNIFICANT CHANGE UP
METHOD TYPE: SIGNIFICANT CHANGE UP
METHOD TYPE: SIGNIFICANT CHANGE UP

## 2019-11-05 RX ORDER — VANCOMYCIN HCL 1 G
1000 VIAL (EA) INTRAVENOUS EVERY 12 HOURS
Refills: 0 | Status: DISCONTINUED | OUTPATIENT
Start: 2019-11-05 | End: 2019-11-06

## 2019-11-05 RX ADMIN — Medication 250 MILLIGRAM(S): at 21:56

## 2019-11-05 RX ADMIN — LOSARTAN POTASSIUM 100 MILLIGRAM(S): 100 TABLET, FILM COATED ORAL at 05:17

## 2019-11-05 RX ADMIN — HEPARIN SODIUM 5000 UNIT(S): 5000 INJECTION INTRAVENOUS; SUBCUTANEOUS at 17:57

## 2019-11-05 RX ADMIN — Medication 120 MILLIGRAM(S): at 21:57

## 2019-11-05 RX ADMIN — PIPERACILLIN AND TAZOBACTAM 25 GRAM(S): 4; .5 INJECTION, POWDER, LYOPHILIZED, FOR SOLUTION INTRAVENOUS at 13:40

## 2019-11-05 RX ADMIN — SERTRALINE 50 MILLIGRAM(S): 25 TABLET, FILM COATED ORAL at 11:33

## 2019-11-05 RX ADMIN — CHLORHEXIDINE GLUCONATE 1 APPLICATION(S): 213 SOLUTION TOPICAL at 11:33

## 2019-11-05 RX ADMIN — PIPERACILLIN AND TAZOBACTAM 25 GRAM(S): 4; .5 INJECTION, POWDER, LYOPHILIZED, FOR SOLUTION INTRAVENOUS at 21:56

## 2019-11-05 RX ADMIN — PIPERACILLIN AND TAZOBACTAM 25 GRAM(S): 4; .5 INJECTION, POWDER, LYOPHILIZED, FOR SOLUTION INTRAVENOUS at 05:17

## 2019-11-05 RX ADMIN — GABAPENTIN 300 MILLIGRAM(S): 400 CAPSULE ORAL at 13:40

## 2019-11-05 RX ADMIN — GABAPENTIN 300 MILLIGRAM(S): 400 CAPSULE ORAL at 05:17

## 2019-11-05 RX ADMIN — GABAPENTIN 300 MILLIGRAM(S): 400 CAPSULE ORAL at 21:57

## 2019-11-05 RX ADMIN — HEPARIN SODIUM 5000 UNIT(S): 5000 INJECTION INTRAVENOUS; SUBCUTANEOUS at 05:16

## 2019-11-05 NOTE — PROGRESS NOTE ADULT - ASSESSMENT
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 Drainage started three weeks ago after pt removed a "strand-like protrusion" from the area. Drainage was white chalk-like initially, but now has turned yellow and has a strong odor. Ultrasound and blood work was done at [facility] on Thursday. No evidence of abscess at that time. Pt has been on augmentin since Monday 10/28. The drainage amount had dec since monday, but the smell has worsened. Pt endorses fever of 100.8 since last Sunday and has been coming back intermittently throughout the last week. Last time she had a fever was this AM. The patient endorses warmth, swelling and sharp pain in the LLE now. Skin feels very sensistive. No nsaids today, has taken then occ throughout the week. (02 Nov 2019 19:13)    Pt states liposarcoma resection and skin graft performed in the 1990s.  Pt had worn a leg brace at that time, which caused a permanent scar and indentation on the lower aspect of graft site.  She denies any prior opening of the wound.  Now with opening of small hole at the level of the scar with thick yellowish drainage, and surrounding swelling, tenderness and erythema.  Pt saw her PCP last Thursday, pt states fevers improved while on Augmentin, however continued to have drainage and swelling.      ER vitals:  Tm 99.6 (Rectal), P 70, /72.  No leukocytosis.  ESR 99, CRP 4.3.  UA (-) nit/LE.  Ucx no growth.  CT LLE performed.  Pt received vanco and zosyn.  ID consult called for further abx managment.     LLE wound infection:    - Pt with open wound with yellow discharge, low grade fever reported at home.  (+) surrounding erythema and tenderness.  CT imaging without drainable collection or OM.      - Recommend continuing broad spectrum abx.  Cont  zosyn.  Wound cultures growing pseudomonas and citrobacter, and today (11/5) also enterobacter and staph lugdunesnsis.  Will resume vancomycin.  Monitor for clinical improvement.  f/u all sensitivities.     - f/u blood cultures.     - f/u Wound care evaluation.  s/p packing placed today.  Leg with decreased drainage, erythema..  f/u path report.    - de-escalate to PO once sensis available.           Will follow,    Muna Romero  [phone]

## 2019-11-05 NOTE — PROGRESS NOTE ADULT - SUBJECTIVE AND OBJECTIVE BOX
Patient is a 69y old  Female who presents with a chief complaint of Leg wound infection (2019 14:42)      SUBJECTIVE / OVERNIGHT EVENTS:  No new symptoms. Afebrile  Review of Systems:   CONSTITUTIONAL: No fever, weight loss, or fatigue  EYES: No eye pain, visual disturbances, or discharge  ENMT:  No difficulty hearing, tinnitus, vertigo; No sinus or throat pain  NECK: No pain or stiffness  BREASTS: No pain, masses, or nipple discharge  RESPIRATORY: No cough, wheezing, chills or hemoptysis; No shortness of breath  CARDIOVASCULAR: No chest pain, palpitations, dizziness, or leg swelling  GASTROINTESTINAL: No abdominal or epigastric pain. No nausea, vomiting, or hematemesis; No diarrhea or constipation. No melena or hematochezia.  GENITOURINARY: No dysuria, frequency, hematuria, or incontinence  NEUROLOGICAL: No headaches, memory loss, loss of strength, numbness, or tremors  SKIN: No itching, burning, rashes, or lesions   LYMPH NODES: No enlarged glands  ENDOCRINE: No heat or cold intolerance; No hair loss  MUSCULOSKELETAL: No joint pain or swelling; No muscle, back, or extremity pain  PSYCHIATRIC: No depression, anxiety, mood swings, or difficulty sleeping  HEME/LYMPH: No easy bruising, or bleeding gums  ALLERY AND IMMUNOLOGIC: No hives or eczema    MEDICATIONS  (STANDING):  diltiazem    milliGRAM(s) Oral daily  gabapentin 300 milliGRAM(s) Oral three times a day  heparin  Injectable 5000 Unit(s) SubCutaneous two times a day  losartan 100 milliGRAM(s) Oral daily  piperacillin/tazobactam IVPB.. 3.375 Gram(s) IV Intermittent every 8 hours  sertraline 50 milliGRAM(s) Oral daily  vancomycin  IVPB 500 milliGRAM(s) IV Intermittent every 12 hours    MEDICATIONS  (PRN):      PHYSICAL EXAM:  Vital Signs Last 24 Hrs  T(C): 37.4 (2019 01:01), Max: 37.4 (2019 01:01)  T(F): 99.3 (2019 01:01), Max: 99.3 (2019 01:01)  HR: 79 (2019 01:01) (77 - 88)  BP: 120/77 (2019 01:01) (120/77 - 144/86)  BP(mean): --  RR: 18 (2019 01:01) (18 - 18)  SpO2: 97% (2019 01:01) (97% - 98%)  I&O's Summary    2019 07:01  -  2019 06:32  --------------------------------------------------------  IN: 200 mL / OUT: 0 mL / NET: 200 mL      GENERAL: NAD, well-developed  HEAD:  Atraumatic, Normocephalic  EYES: EOMI, PERRLA, conjunctiva and sclera clear  NECK: Supple, No JVD  CHEST/LUNG: Clear to auscultation bilaterally; No wheeze  HEART: Regular rate and rhythm; No murmurs, rubs, or gallops  ABDOMEN: Soft, Nontender, Nondistended; Bowel sounds present  EXTREMITIES:  2+ Peripheral Pulses, No clubbing, cyanosis, or edema  PSYCH: AAOx3  NEUROLOGY: non-focal  SKIN: Right leg with an ulcer posteriorly, mild drainage noted    LABS:  CAPILLARY BLOOD GLUCOSE                              10.8   5.69  )-----------( 261      ( 2019 09:13 )             33.0     11-03    140  |  103  |  14  ----------------------------<  99  3.2<L>   |  28  |  0.55    Ca    8.7      2019 07:28    TPro  7.3  /  Alb  3.6  /  TBili  0.5  /  DBili  x   /  AST  15  /  ALT  12  /  AlkPhos  53  11-03    PT/INR - ( 2019 13:03 )   PT: 13.5 sec;   INR: 1.17 ratio         PTT - ( 2019 13:03 )  PTT:29.8 sec      Urinalysis Basic - ( 2019 14:02 )    Color: Yellow / Appearance: Clear / S.023 / pH: x  Gluc: x / Ketone: Negative  / Bili: Negative / Urobili: Negative   Blood: x / Protein: Trace / Nitrite: Negative   Leuk Esterase: Negative / RBC: 5 /hpf / WBC 2 /HPF   Sq Epi: x / Non Sq Epi: 3 /hpf / Bacteria: Negative        RADIOLOGY & ADDITIONAL TESTS:    Imaging Personally Reviewed:    Consultant(s) Notes Reviewed:      Care Discussed with Consultants/Other Providers:

## 2019-11-05 NOTE — PROGRESS NOTE ADULT - PROBLEM SELECTOR PLAN 1
Failed out patient PO Augmentin. Left leg has an open wound. CT with a radio opaque area, unknown etiology. ID and wound care assessment noted. On zosyn for Pseudomonas and citrobacter in cultures from wound. Also has Staph aureus, Vanco restarted

## 2019-11-05 NOTE — PROGRESS NOTE ADULT - SUBJECTIVE AND OBJECTIVE BOX
Infectious Diseases progress note:    Subjective: No acute o/n events.  Pt seen by wound care, s/p biopsy sent for path.  Cultures growing pseudomonas, citrobacter, staph lugdunensis.  Vanco restarted.     ROS:  CONSTITUTIONAL:  No fever, chills, rigors  CARDIOVASCULAR:  No chest pain or palpitations  RESPIRATORY:   No SOB, cough, dyspnea on exertion.  No wheezing  GASTROINTESTINAL:  No abd pain, N/V, diarrhea/constipation  EXTREMITIES:  No swelling or joint pain  GENITOURINARY:  No burning on urination, increased frequency or urgency.  No flank pain  NEUROLOGIC:  No HA, visual disturbances  SKIN: No rashes    Allergies    Compazine (Swelling)    Intolerances        ANTIBIOTICS/RELEVANT:  antimicrobials  piperacillin/tazobactam IVPB.. 3.375 Gram(s) IV Intermittent every 8 hours  vancomycin  IVPB 1000 milliGRAM(s) IV Intermittent every 12 hours    immunologic:    OTHER:  chlorhexidine 2% Cloths 1 Application(s) Topical <User Schedule>  diltiazem    milliGRAM(s) Oral daily  gabapentin 300 milliGRAM(s) Oral three times a day  heparin  Injectable 5000 Unit(s) SubCutaneous two times a day  losartan 100 milliGRAM(s) Oral daily  sertraline 50 milliGRAM(s) Oral daily      Objective:  Vital Signs Last 24 Hrs  T(C): 36.8 (05 Nov 2019 16:04), Max: 37.2 (04 Nov 2019 21:39)  T(F): 98.2 (05 Nov 2019 16:04), Max: 99 (04 Nov 2019 21:39)  HR: 75 (05 Nov 2019 16:04) (70 - 75)  BP: 143/88 (05 Nov 2019 16:04) (112/67 - 143/88)  BP(mean): --  RR: 18 (05 Nov 2019 16:04) (18 - 18)  SpO2: 99% (05 Nov 2019 16:04) (95% - 99%)    PHYSICAL EXAM:  Constitutional:NAD  Eyes:WESLEY, EOMI  Ear/Nose/Throat: no thrush, mucositis.  Moist mucous membranes	  Neck:no JVD, no lymphadenopathy, supple  Respiratory: CTA esvin  Cardiovascular: S1S2 RRR, no murmurs  Gastrointestinal:soft, nontender,  nondistended (+) BS  Extremities:no e/e/c  Skin:  L leg wound packing in place, decreased swelling, erythema, tenderness        LABS:    11-04    138  |  105  |  12  ----------------------------<  93  4.1   |  25  |  0.59    Ca    8.6      04 Nov 2019 07:14                  Vancomycin Level, Trough: <4.0 ug/mL (11-04 @ 13:16)              MICROBIOLOGY:      Culture - Surgical Swab (11.03.19 @ 17:51)    -  Amikacin: S <=16    -  Amikacin: S <=16    -  Ampicillin: R <=8 These ampicillin results predict results for amoxicillin    -  Ampicillin/Sulbactam: R <=8/4 Enterobacter, Citrobacter, and Serratia may develop resistance during prolonged therapy (3-4 days)    -  Aztreonam: S <=4    -  Aztreonam: S <=4    -  Cefazolin: R >16 Enterobacter, Citrobacter, and Serratia may develop resistance during prolonged therapy (3-4 days)    -  Cefepime: S <=2    -  Cefepime: S <=4    -  Cefoxitin: R >16    -  Ceftazidime: S 4    -  Ceftriaxone: S <=1 Enterobacter, Citrobacter, and Serratia may develop resistance during prolonged therapy    -  Ciprofloxacin: S <=1    -  Ciprofloxacin: S <=1    -  Ertapenem: S <=1    -  Gentamicin: S 4    -  Gentamicin: S <=4    -  Imipenem: S 2    -  Imipenem: S <=1    -  Levofloxacin: S <=2    -  Levofloxacin: S <=2    -  Meropenem: S <=1    -  Meropenem: S <=1    -  Piperacillin/Tazobactam: S <=8    -  Piperacillin/Tazobactam: S <=16    -  Tobramycin: S <=2    -  Tobramycin: S <=4    -  Trimethoprim/Sulfamethoxazole: S <=2/38    Specimen Source: .Surgical Swab Other    Culture Results:   Moderate Enterobacter cloacae  Moderate Pseudomonas aeruginosa  Moderate Citrobacter koseri Susceptibility to follow.  Rare Staphylococcus lugdunensis Susceptibility to follow.  Few Alpha hemolytic strep Susceptibilites not performed.    Organism Identification: Enterobacter cloacae  Pseudomonas aeruginosa    Organism: Pseudomonas aeruginosa    Organism: Enterobacter cloacae    Method Type: IRWIN    Method Type: IRWIN        RADIOLOGY & ADDITIONAL STUDIES:

## 2019-11-06 ENCOUNTER — TRANSCRIPTION ENCOUNTER (OUTPATIENT)
Age: 69
End: 2019-11-06

## 2019-11-06 VITALS
DIASTOLIC BLOOD PRESSURE: 70 MMHG | RESPIRATION RATE: 18 BRPM | TEMPERATURE: 98 F | HEART RATE: 72 BPM | SYSTOLIC BLOOD PRESSURE: 142 MMHG | OXYGEN SATURATION: 99 %

## 2019-11-06 LAB
-  AMIKACIN: SIGNIFICANT CHANGE UP
-  AMIKACIN: SIGNIFICANT CHANGE UP
-  AMOXICILLIN/CLAVULANIC ACID: SIGNIFICANT CHANGE UP
-  AMPICILLIN/SULBACTAM: SIGNIFICANT CHANGE UP
-  AMPICILLIN: SIGNIFICANT CHANGE UP
-  AZTREONAM: SIGNIFICANT CHANGE UP
-  AZTREONAM: SIGNIFICANT CHANGE UP
-  CEFAZOLIN: SIGNIFICANT CHANGE UP
-  CEFEPIME: SIGNIFICANT CHANGE UP
-  CEFEPIME: SIGNIFICANT CHANGE UP
-  CEFOXITIN: SIGNIFICANT CHANGE UP
-  CEFTAZIDIME: SIGNIFICANT CHANGE UP
-  CEFTRIAXONE: SIGNIFICANT CHANGE UP
-  CIPROFLOXACIN: SIGNIFICANT CHANGE UP
-  CIPROFLOXACIN: SIGNIFICANT CHANGE UP
-  CLINDAMYCIN: SIGNIFICANT CHANGE UP
-  CLINDAMYCIN: SIGNIFICANT CHANGE UP
-  ERTAPENEM: SIGNIFICANT CHANGE UP
-  ERYTHROMYCIN: SIGNIFICANT CHANGE UP
-  ERYTHROMYCIN: SIGNIFICANT CHANGE UP
-  GENTAMICIN: SIGNIFICANT CHANGE UP
-  IMIPENEM: SIGNIFICANT CHANGE UP
-  IMIPENEM: SIGNIFICANT CHANGE UP
-  LEVOFLOXACIN: SIGNIFICANT CHANGE UP
-  LEVOFLOXACIN: SIGNIFICANT CHANGE UP
-  MEROPENEM: SIGNIFICANT CHANGE UP
-  MEROPENEM: SIGNIFICANT CHANGE UP
-  OXACILLIN: SIGNIFICANT CHANGE UP
-  OXACILLIN: SIGNIFICANT CHANGE UP
-  PENICILLIN: SIGNIFICANT CHANGE UP
-  PENICILLIN: SIGNIFICANT CHANGE UP
-  PIPERACILLIN/TAZOBACTAM: SIGNIFICANT CHANGE UP
-  PIPERACILLIN/TAZOBACTAM: SIGNIFICANT CHANGE UP
-  RIFAMPIN: SIGNIFICANT CHANGE UP
-  RIFAMPIN: SIGNIFICANT CHANGE UP
-  TETRACYCLINE: SIGNIFICANT CHANGE UP
-  TETRACYCLINE: SIGNIFICANT CHANGE UP
-  TOBRAMYCIN: SIGNIFICANT CHANGE UP
-  TOBRAMYCIN: SIGNIFICANT CHANGE UP
-  TRIMETHOPRIM/SULFAMETHOXAZOLE: SIGNIFICANT CHANGE UP
-  VANCOMYCIN: SIGNIFICANT CHANGE UP
-  VANCOMYCIN: SIGNIFICANT CHANGE UP
HCT VFR BLD CALC: 33.3 % — LOW (ref 34.5–45)
HGB BLD-MCNC: 10.5 G/DL — LOW (ref 11.5–15.5)
MCHC RBC-ENTMCNC: 29.4 PG — SIGNIFICANT CHANGE UP (ref 27–34)
MCHC RBC-ENTMCNC: 31.5 GM/DL — LOW (ref 32–36)
MCV RBC AUTO: 93.3 FL — SIGNIFICANT CHANGE UP (ref 80–100)
METHOD TYPE: SIGNIFICANT CHANGE UP
PLATELET # BLD AUTO: 308 K/UL — SIGNIFICANT CHANGE UP (ref 150–400)
RBC # BLD: 3.57 M/UL — LOW (ref 3.8–5.2)
RBC # FLD: 11.9 % — SIGNIFICANT CHANGE UP (ref 10.3–14.5)
WBC # BLD: 5.62 K/UL — SIGNIFICANT CHANGE UP (ref 3.8–10.5)
WBC # FLD AUTO: 5.62 K/UL — SIGNIFICANT CHANGE UP (ref 3.8–10.5)

## 2019-11-06 RX ORDER — GABAPENTIN 400 MG/1
1 CAPSULE ORAL
Qty: 0 | Refills: 0 | DISCHARGE
Start: 2019-11-06 | End: 2019-12-05

## 2019-11-06 RX ORDER — CANDESARTAN CILEXETIL 8 MG/1
1 TABLET ORAL
Qty: 30 | Refills: 0
Start: 2019-11-06 | End: 2019-12-05

## 2019-11-06 RX ORDER — CIPROFLOXACIN LACTATE 400MG/40ML
1 VIAL (ML) INTRAVENOUS
Qty: 7 | Refills: 0
Start: 2019-11-06 | End: 2019-11-12

## 2019-11-06 RX ORDER — GABAPENTIN 400 MG/1
1 CAPSULE ORAL
Qty: 90 | Refills: 0
Start: 2019-11-06 | End: 2019-12-05

## 2019-11-06 RX ORDER — GABAPENTIN 400 MG/1
1 CAPSULE ORAL
Qty: 0 | Refills: 0 | DISCHARGE

## 2019-11-06 RX ORDER — LOSARTAN/HYDROCHLOROTHIAZIDE 100MG-25MG
1 TABLET ORAL
Qty: 0 | Refills: 0 | DISCHARGE

## 2019-11-06 RX ADMIN — LOSARTAN POTASSIUM 100 MILLIGRAM(S): 100 TABLET, FILM COATED ORAL at 05:18

## 2019-11-06 RX ADMIN — Medication 250 MILLIGRAM(S): at 12:15

## 2019-11-06 RX ADMIN — SERTRALINE 50 MILLIGRAM(S): 25 TABLET, FILM COATED ORAL at 12:15

## 2019-11-06 RX ADMIN — GABAPENTIN 300 MILLIGRAM(S): 400 CAPSULE ORAL at 12:15

## 2019-11-06 RX ADMIN — PIPERACILLIN AND TAZOBACTAM 25 GRAM(S): 4; .5 INJECTION, POWDER, LYOPHILIZED, FOR SOLUTION INTRAVENOUS at 05:17

## 2019-11-06 RX ADMIN — HEPARIN SODIUM 5000 UNIT(S): 5000 INJECTION INTRAVENOUS; SUBCUTANEOUS at 05:18

## 2019-11-06 RX ADMIN — GABAPENTIN 300 MILLIGRAM(S): 400 CAPSULE ORAL at 05:18

## 2019-11-06 NOTE — DISCHARGE NOTE NURSING/CASE MANAGEMENT/SOCIAL WORK - NSDCFUADDAPPT_GEN_ALL_CORE_FT
Upon discharge f/u as outpatient at [facility] [address] [facility] [phone]  Seen with Dr. Bland. Discussed with clinical nurse

## 2019-11-06 NOTE — PROGRESS NOTE ADULT - SUBJECTIVE AND OBJECTIVE BOX
Infectious Diseases progress note:    Subjective: NAD, feels better.  Leg is less tender and swollen.  Able to ambulate.      ROS:  CONSTITUTIONAL:  No fever, chills, rigors  CARDIOVASCULAR:  No chest pain or palpitations  RESPIRATORY:   No SOB, cough, dyspnea on exertion.  No wheezing  GASTROINTESTINAL:  No abd pain, N/V, diarrhea/constipation  EXTREMITIES:  No swelling or joint pain  GENITOURINARY:  No burning on urination, increased frequency or urgency.  No flank pain  NEUROLOGIC:  No HA, visual disturbances  SKIN: No rashes    Allergies    Compazine (Swelling)    Intolerances        ANTIBIOTICS/RELEVANT:  antimicrobials  piperacillin/tazobactam IVPB.. 3.375 Gram(s) IV Intermittent every 8 hours  vancomycin  IVPB 1000 milliGRAM(s) IV Intermittent every 12 hours    immunologic:    OTHER:  chlorhexidine 2% Cloths 1 Application(s) Topical <User Schedule>  diltiazem    milliGRAM(s) Oral daily  gabapentin 300 milliGRAM(s) Oral three times a day  heparin  Injectable 5000 Unit(s) SubCutaneous two times a day  losartan 100 milliGRAM(s) Oral daily  sertraline 50 milliGRAM(s) Oral daily      Objective:  Vital Signs Last 24 Hrs  T(C): 36.7 (06 Nov 2019 09:30), Max: 36.8 (05 Nov 2019 16:04)  T(F): 98.1 (06 Nov 2019 09:30), Max: 98.3 (06 Nov 2019 00:30)  HR: 80 (06 Nov 2019 09:30) (73 - 81)  BP: 132/79 (06 Nov 2019 09:30) (121/67 - 143/88)  BP(mean): --  RR: 18 (06 Nov 2019 09:30) (18 - 18)  SpO2: 97% (06 Nov 2019 09:30) (95% - 99%)    PHYSICAL EXAM:  Constitutional:NAD  Eyes:WESLEY, EOMI  Ear/Nose/Throat: no thrush, mucositis.  Moist mucous membranes	  Neck:no JVD, no lymphadenopathy, supple  Respiratory: CTA esvin  Cardiovascular: S1S2 RRR, no murmurs  Gastrointestinal:soft, nontender,  nondistended (+) BS  Extremities:no e/e/c  Skin:  LLE packing in place, serous drainage on gauze.  Swelling improved.  No TTP        LABS:                        10.5   5.62  )-----------( 308      ( 06 Nov 2019 09:12 )             33.3                       Vancomycin Level, Trough: <4.0 ug/mL (11-04 @ 13:16)              MICROBIOLOGY:    Culture - Surgical Swab (11.04.19 @ 18:42)    Specimen Source: .Surgical Swab Left lower leg    Culture Results:   **Please Note**: This is a Corrected Report**  Moderate Gram Positive Cocci Identification and susceptibility to follow.  Numerous Pseudomonas aeruginosa Susceptibility to follow.  Previously reported as:  Moderate Staphylococcus aureus  Numerous Pseudomonas aeruginosa        Culture - Surgical Swab (11.03.19 @ 17:51)    -  Gentamicin: S 4    -  Gentamicin: S <=4    -  Gentamicin: S <=2    -  Gentamicin: S <=1 Should not be used as monotherapy    -  Imipenem: S 2    -  Imipenem: S <=1    -  Imipenem: S <=1    -  Levofloxacin: S <=2    -  Levofloxacin: S <=2    -  Levofloxacin: S <=2    -  Meropenem: S <=1    -  Meropenem: S <=1    -  Meropenem: S <=1    -  Oxacillin: S 0.5    -  Penicillin: R >8    -  Piperacillin/Tazobactam: S <=8    -  Piperacillin/Tazobactam: S <=16    -  Piperacillin/Tazobactam: S <=8    -  RIF- Rifampin: S <=1 Should not be used as monotherapy    -  Tetra/Doxy: S <=1    -  Tobramycin: S <=2    -  Tobramycin: S <=4    -  Tobramycin: S <=2    -  Trimethoprim/Sulfamethoxazole: S <=2/38    -  Trimethoprim/Sulfamethoxazole: S <=2/38    -  Trimethoprim/Sulfamethoxazole: S <=0.5/9.5    -  Vancomycin: S 1    -  Amikacin: S <=16    -  Amikacin: S <=16    -  Amikacin: S <=16    -  Amoxicillin/Clavulanic Acid: S <=8/4    -  Ampicillin: R <=8 These ampicillin results predict results for amoxicillin    -  Ampicillin: R >16 These ampicillin results predict results for amoxicillin    -  Ampicillin/Sulbactam: R <=8/4 Enterobacter, Citrobacter, and Serratia may develop resistance during prolonged therapy (3-4 days)    -  Ampicillin/Sulbactam: S <=4/2 Enterobacter, Citrobacter, and Serratia may develop resistance during prolonged therapy (3-4 days)    -  Ampicillin/Sulbactam: S <=8/4    -  Aztreonam: S <=4    -  Aztreonam: S <=4    -  Aztreonam: S <=4    -  Cefazolin: R >16 Enterobacter, Citrobacter, and Serratia may develop resistance during prolonged therapy (3-4 days)    -  Cefazolin: S <=2 Enterobacter, Citrobacter, and Serratia may develop resistance during prolonged therapy (3-4 days)    -  Cefazolin: S <=4    -  Cefepime: S <=2    -  Cefepime: S <=4    -  Cefepime: S <=2    -  Cefoxitin: R >16    -  Cefoxitin: S <=8    -  Ceftazidime: S 4    -  Ceftriaxone: S <=1 Enterobacter, Citrobacter, and Serratia may develop resistance during prolonged therapy    -  Ceftriaxone: S <=1 Enterobacter, Citrobacter, and Serratia may develop resistance during prolonged therapy    -  Ciprofloxacin: S <=1    -  Ciprofloxacin: S <=1    -  Ciprofloxacin: S <=1    -  Clindamycin: S <=0.25    -  Ertapenem: S <=1    -  Ertapenem: S <=0.5    -  Erythromycin: S <=0.25    Specimen Source: .Surgical Swab Other    Culture Results:   Moderate Enterobacter cloacae  Moderate Pseudomonas aeruginosa  Moderate Citrobacter koseri  Rare Staphylococcus lugdunensis  Few Alpha hemolytic strep Susceptibilites not performed.    Organism Identification: Enterobacter cloacae  Pseudomonas aeruginosa  Citrobacter koseri  Staphylococcus lugdunensis    Organism: Pseudomonas aeruginosa    Organism: Enterobacter cloacae    Organism: Citrobacter koseri    Organism: Staphylococcus lugdunensis    Method Type: IRWIN    Method Type: IRWIN    Method Type: IRWIN    Method Type: IRWIN    Culture - Blood (11.02.19 @ 17:45)    Specimen Source: .Blood Blood-Peripheral    Culture Results:   No growth to date.    Culture - Blood (11.02.19 @ 17:45)    Specimen Source: .Blood Blood-Peripheral    Culture Results:   No growth to date.          RADIOLOGY & ADDITIONAL STUDIES:    < from: Xray Tibia + Fibula 2 Views, Left (11.02.19 @ 14:21) >  FINDINGS:     Soft tissue defect within the lateral left leg with porus radiodense   foreign body, possibly packing material.    No fracture or dislocation.    < end of copied text >        < from: CT Lower Extremity w/ IV Cont, Left (11.02.19 @ 14:08) >  IMPRESSION:    Large open wound filled with radiopaque material at the lateral aspect of   the lower leg at the level of the proximal third of the fibula with   prominent adjacent soft tissue swelling but without evidence of a   distinctly encapsulated peripherally enhancing fluid collection to   suggest an encapsulated abscess. No CT evidence ofosteomyelitis.    < end of copied text >

## 2019-11-06 NOTE — DISCHARGE NOTE NURSING/CASE MANAGEMENT/SOCIAL WORK - PATIENT PORTAL LINK FT
You can access the FollowMyHealth Patient Portal offered by [facility] by registering at the following website: [email or web address]. By joining [employer]’s FollowMyHealth portal, you will also be able to view your health information using other applications (apps) compatible with our system.

## 2019-11-06 NOTE — PROGRESS NOTE ADULT - ASSESSMENT
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 Drainage started three weeks ago after pt removed a "strand-like protrusion" from the area. Drainage was white chalk-like initially, but now has turned yellow and has a strong odor. Ultrasound and blood work was done at [facility] on Thursday. No evidence of abscess at that time. Pt has been on augmentin since Monday 10/28. The drainage amount had dec since monday, but the smell has worsened. Pt endorses fever of 100.8 since last Sunday and has been coming back intermittently throughout the last week. Last time she had a fever was this AM. The patient endorses warmth, swelling and sharp pain in the LLE now. Skin feels very sensistive. No nsaids today, has taken then occ throughout the week. (02 Nov 2019 19:13)    Pt states liposarcoma resection and skin graft performed in the 1990s.  Pt had worn a leg brace at that time, which caused a permanent scar and indentation on the lower aspect of graft site.  She denies any prior opening of the wound.  Now with opening of small hole at the level of the scar with thick yellowish drainage, and surrounding swelling, tenderness and erythema.  Pt saw her PCP last Thursday, pt states fevers improved while on Augmentin, however continued to have drainage and swelling.      ER vitals:  Tm 99.6 (Rectal), P 70, /72.  No leukocytosis.  ESR 99, CRP 4.3.  UA (-) nit/LE.  Ucx no growth.  CT LLE performed.  Pt received vanco and zosyn.  ID consult called for further abx managment.     LLE wound infection:    - Pt with open wound with yellow discharge, low grade fever reported at home.  (+) surrounding erythema and tenderness.  CT imaging without drainable collection or OM.      - Blood and urine cx negative.  Wound cx from 11/3 and 11/4 reviewed.  Pt currently on vanco/zosyn.  Pt growing pseudomonas, staph lugdunensis, enterobacter, citrobacter.  Can change to PO doxycycline 100mg bid and levaquin 500mg qdaily x 7 more days.    - Arrange for home care services for wound packing and continued care.  Outpt wound care f/u.  s/p biopsy.  f/u path report as outpt.       d/c planning.  OK to d/c from ID standpoint.            Muna Romero  [phone]

## 2019-11-07 LAB
CULTURE RESULTS: SIGNIFICANT CHANGE UP
CULTURE RESULTS: SIGNIFICANT CHANGE UP
SPECIMEN SOURCE: SIGNIFICANT CHANGE UP
SPECIMEN SOURCE: SIGNIFICANT CHANGE UP

## 2019-11-08 LAB
CULTURE RESULTS: SIGNIFICANT CHANGE UP
CULTURE RESULTS: SIGNIFICANT CHANGE UP
ORGANISM # SPEC MICROSCOPIC CNT: SIGNIFICANT CHANGE UP
SPECIMEN SOURCE: SIGNIFICANT CHANGE UP
SPECIMEN SOURCE: SIGNIFICANT CHANGE UP

## 2019-11-12 PROCEDURE — 88304 TISSUE EXAM BY PATHOLOGIST: CPT

## 2019-11-12 PROCEDURE — 73590 X-RAY EXAM OF LOWER LEG: CPT

## 2019-11-12 PROCEDURE — 80202 ASSAY OF VANCOMYCIN: CPT

## 2019-11-12 PROCEDURE — 85730 THROMBOPLASTIN TIME PARTIAL: CPT

## 2019-11-12 PROCEDURE — 93005 ELECTROCARDIOGRAM TRACING: CPT

## 2019-11-12 PROCEDURE — 82435 ASSAY OF BLOOD CHLORIDE: CPT

## 2019-11-12 PROCEDURE — 86140 C-REACTIVE PROTEIN: CPT

## 2019-11-12 PROCEDURE — 81001 URINALYSIS AUTO W/SCOPE: CPT

## 2019-11-12 PROCEDURE — 87070 CULTURE OTHR SPECIMN AEROBIC: CPT

## 2019-11-12 PROCEDURE — 87086 URINE CULTURE/COLONY COUNT: CPT

## 2019-11-12 PROCEDURE — 87040 BLOOD CULTURE FOR BACTERIA: CPT

## 2019-11-12 PROCEDURE — 82330 ASSAY OF CALCIUM: CPT

## 2019-11-12 PROCEDURE — 87186 SC STD MICRODIL/AGAR DIL: CPT

## 2019-11-12 PROCEDURE — 85027 COMPLETE CBC AUTOMATED: CPT

## 2019-11-12 PROCEDURE — 80048 BASIC METABOLIC PNL TOTAL CA: CPT

## 2019-11-12 PROCEDURE — 85652 RBC SED RATE AUTOMATED: CPT

## 2019-11-12 PROCEDURE — 73701 CT LOWER EXTREMITY W/DYE: CPT

## 2019-11-12 PROCEDURE — 84295 ASSAY OF SERUM SODIUM: CPT

## 2019-11-12 PROCEDURE — 99285 EMERGENCY DEPT VISIT HI MDM: CPT | Mod: 25

## 2019-11-12 PROCEDURE — 96374 THER/PROPH/DIAG INJ IV PUSH: CPT | Mod: XU

## 2019-11-12 PROCEDURE — 83605 ASSAY OF LACTIC ACID: CPT

## 2019-11-12 PROCEDURE — 87075 CULTR BACTERIA EXCEPT BLOOD: CPT

## 2019-11-12 PROCEDURE — 84132 ASSAY OF SERUM POTASSIUM: CPT

## 2019-11-12 PROCEDURE — 80053 COMPREHEN METABOLIC PANEL: CPT

## 2019-11-12 PROCEDURE — 86803 HEPATITIS C AB TEST: CPT

## 2019-11-12 PROCEDURE — 85014 HEMATOCRIT: CPT

## 2019-11-12 PROCEDURE — 82803 BLOOD GASES ANY COMBINATION: CPT

## 2019-11-12 PROCEDURE — 82947 ASSAY GLUCOSE BLOOD QUANT: CPT

## 2019-11-12 PROCEDURE — 85610 PROTHROMBIN TIME: CPT

## 2019-11-13 LAB — SURGICAL PATHOLOGY STUDY: SIGNIFICANT CHANGE UP

## 2019-11-14 ENCOUNTER — RESULT REVIEW (OUTPATIENT)
Age: 69
End: 2019-11-14

## 2019-11-15 ENCOUNTER — APPOINTMENT (OUTPATIENT)
Dept: WOUND CARE | Facility: CLINIC | Age: 69
End: 2019-11-15
Payer: MEDICARE

## 2019-11-15 PROCEDURE — 11042 DBRDMT SUBQ TIS 1ST 20SQCM/<: CPT

## 2019-11-15 NOTE — ASSESSMENT
[FreeTextEntry1] : 69 yr. old presents with remaining wound on left lateral calf from a sarcoma, post radiation. Calcified stones in area of wound bed.\par \par Plan - flush with saline, pack with iodoform, 4x4 over, wilber.\par Orders for VN

## 2019-11-15 NOTE — HISTORY OF PRESENT ILLNESS
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

## 2019-11-15 NOTE — PHYSICAL EXAM
[JVD] : no jugular venous distention  [Ankle Swelling (On Exam)] : not present [Varicose Veins Of Lower Extremities] : not present [Ankle Swelling On The Left] : of the left ankle [Varicose Veins Of The Left Leg] : of the left leg [] : of the left leg [Abdomen Tenderness] : ~T ~M No abdominal tenderness [de-identified] : un labored [de-identified] : NAD, well groomed [de-identified] : intact [de-identified] : ambulatory [FreeTextEntry1] : lateral calf [FreeTextEntry2] : 0.8 [FreeTextEntry3] : 0.8 [FreeTextEntry4] : 0.4 [de-identified] :  9 o'clock 3 cm [de-identified] : possibly RT related [FreeTextEntry5] : forceps [de-identified] : skin and subcutaneous [de-identified] : iodoform [de-identified] : pathology obtained\par calcified tissue debrided from wound bed\par Suspect that bone reported in initial path is actually calcified soft tissue, related to prior surgery , RT( fat necrosis)\par Debrided tissue today again sent for path  [TWNoteComboBox1] : Left [TWNoteComboBox6] : Surgical [TWNoteComboBox7] : Mechanical [de-identified] : Debridement performed of all devitalized tissue to bleeding viable tissue

## 2019-11-20 ENCOUNTER — RESULT REVIEW (OUTPATIENT)
Age: 69
End: 2019-11-20

## 2019-11-21 ENCOUNTER — APPOINTMENT (OUTPATIENT)
Dept: WOUND CARE | Facility: CLINIC | Age: 69
End: 2019-11-21
Payer: MEDICARE

## 2019-11-21 DIAGNOSIS — Z92.3 PERSONAL HISTORY OF IRRADIATION: ICD-10-CM

## 2019-11-21 PROCEDURE — 99213 OFFICE O/P EST LOW 20 MIN: CPT

## 2019-11-21 NOTE — PHYSICAL EXAM
[Ankle Swelling On The Left] : of the left ankle [Varicose Veins Of The Left Leg] : of the left leg [JVD] : no jugular venous distention  [Ankle Swelling (On Exam)] : not present [Varicose Veins Of Lower Extremities] : not present [] : not present [Abdomen Tenderness] : ~T ~M No abdominal tenderness [de-identified] : NAD, well groomed [de-identified] : un labored [de-identified] : ambulatory [de-identified] : intact [FreeTextEntry1] : lateral calf [FreeTextEntry2] : 0.8 [FreeTextEntry3] : 0.8 [FreeTextEntry4] : 0.4 [de-identified] :  9 o'clock 1 cm [de-identified] : possibly RT related [de-identified] : scar tissue [FreeTextEntry5] : forceps [de-identified] : skin and subcutaneous [de-identified] : iodoform [de-identified] : \par calcified tissue debrided from wound bed\par Suspect that bone reported in initial path is actually calcified soft tissue, related to prior surgery , RT( fat necrosis)\par Debrided tissue today again sent for path\par 11/21 again tissue submitted for path  [TWNoteComboBox1] : Left [TWNoteComboBox3] : FT [TWNoteComboBox4] : Small [TWNoteComboBox5] : Yes [TWNoteComboBox6] : Surgical [de-identified] : No [de-identified] : other [de-identified] : None [de-identified] : None [de-identified] : 100% [de-identified] : No [TWNoteComboBox7] : Mechanical [de-identified] : Debridement performed of all devitalized tissue to bleeding viable tissue

## 2019-11-21 NOTE — ASSESSMENT
[FreeTextEntry1] : 69 yr. old presents with remaining wound on left lateral calf from a sarcoma, post radiation. Calcified stones in area of wound bed.\par \par Plan - flush with saline, pack with iodoform, 4x4 over, wilber.\par Orders for VN\par Discussed pathology/ xray reports\par 11/21 Radio opaque material on plain film corresponds to calcifications debrided from wound\par Both studies in hospital confirm nl bones in left leg\par [place] [facility] [place]

## 2019-11-21 NOTE — HISTORY OF PRESENT ILLNESS
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

## 2019-12-05 ENCOUNTER — APPOINTMENT (OUTPATIENT)
Dept: WOUND CARE | Facility: CLINIC | Age: 69
End: 2019-12-05
Payer: MEDICARE

## 2019-12-05 DIAGNOSIS — S81.802D UNSPECIFIED OPEN WOUND, LEFT LOWER LEG, SUBSEQUENT ENCOUNTER: ICD-10-CM

## 2019-12-05 DIAGNOSIS — Z85.831 PERSONAL HISTORY OF MALIGNANT NEOPLASM OF SOFT TISSUE: ICD-10-CM

## 2019-12-05 PROCEDURE — 11042 DBRDMT SUBQ TIS 1ST 20SQCM/<: CPT

## 2019-12-05 NOTE — HISTORY OF PRESENT ILLNESS
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 at [facility] [place] [facility] [place], last treated in 1990s at which time had 3rd excision and closure with a flap.\par Also had had RT\par Debrided in hospital - necrotic tissue , bone?\par 11/21 patient reports that [facility] [place] [facility] [place] treated recurrent liposarcoma with Brachytherapy, which I suspect is responsible for wound calcifications.\par This occurred in 1990's\par Initial wound drainage stained pants which patient saved and brought to office - discharge appears to be calcifications , part of which was submitted for pathology\par 12/5 awaiting visit at [facility] [place] [facility] [place]- No vn due to limitation by carrier- performs her own wound care

## 2019-12-05 NOTE — ASSESSMENT
[FreeTextEntry1] : 69 yr. old presents with remaining wound on left lateral calf from a sarcoma, post radiation. Calcified stones in area of wound bed.\par \par Plan - flush with saline, pack with iodoform, 4x4 over, wilber.\par Orders for VN\par Discussed pathology/ xray reports\par 11/21 Radio opaque material on plain film corresponds to calcifications debrided from wound\par Both studies in hospital confirm nl bones in left leg\par Advised evaluation at [facility] [place] where she was treated for sarcoma\par \par debridement of soft tissue  calcification\par \par 12/5 will continue self care - instructions given\par

## 2019-12-05 NOTE — PHYSICAL EXAM
[Ankle Swelling On The Left] : of the left ankle [Varicose Veins Of The Left Leg] : of the left leg [2 x 2] : 2 x 2  [Ankle Swelling (On Exam)] : not present [Varicose Veins Of Lower Extremities] : not present [JVD] : no jugular venous distention  [Abdomen Tenderness] : ~T ~M No abdominal tenderness [] : not present [de-identified] : NAD, well groomed [de-identified] : un labored [de-identified] : ambulatory [de-identified] : intact [FreeTextEntry1] : LEFT lateral calf [FreeTextEntry3] : 2 cm [FreeTextEntry2] : 0.8 cm [FreeTextEntry4] : 0.4 [de-identified] : possibly RT related [FreeTextEntry5] : forceps   [de-identified] : skin and subcutaneous [de-identified] : scar tissue [de-identified] : @9 o clock 2 cm [TWNoteComboBox3] : FT [de-identified] : \par calcified tissue debrided from wound bed\par Suspect that bone reported in initial path is actually calcified soft tissue, related to prior surgery , RT( fat necrosis)\par Debrided tissue today again sent for path\par 11/21 again tissue submitted for path \par \par prior - 0.8/0.8 tunneling @ 9 oclock 1 cm\par 12/5 more granulation , less calcification , no obvious tumor recurrence [TWNoteComboBox1] : Left [de-identified] : iodoform [TWNoteComboBox5] : No [TWNoteComboBox6] : Surgical [TWNoteComboBox4] : Small [de-identified] : Yes [de-identified] : None [de-identified] : None [de-identified] : other [de-identified] : No [TWNoteComboBox7] : Mechanical [de-identified] : 100% [de-identified] : Other [de-identified] : Debridement performed of all devitalized tissue to bleeding viable tissue

## 2019-12-19 ENCOUNTER — APPOINTMENT (OUTPATIENT)
Dept: WOUND CARE | Facility: CLINIC | Age: 69
End: 2019-12-19

## 2020-02-07 NOTE — ED PROVIDER NOTE - CARDIOVASCULAR NEGATIVE STATEMENT, MLM
Cardiac Rehab Evaluation: It will be at least 6 weeks before he can be considered for CR       normal rate and rhythm, no chest pain and no edema.

## 2020-08-28 ENCOUNTER — APPOINTMENT (OUTPATIENT)
Dept: MAMMOGRAPHY | Facility: IMAGING CENTER | Age: 70
End: 2020-08-28
Payer: MEDICARE

## 2020-08-28 ENCOUNTER — RESULT REVIEW (OUTPATIENT)
Age: 70
End: 2020-08-28

## 2020-08-28 ENCOUNTER — APPOINTMENT (OUTPATIENT)
Dept: ULTRASOUND IMAGING | Facility: IMAGING CENTER | Age: 70
End: 2020-08-28
Payer: MEDICARE

## 2020-08-28 ENCOUNTER — OUTPATIENT (OUTPATIENT)
Dept: OUTPATIENT SERVICES | Facility: HOSPITAL | Age: 70
LOS: 1 days | End: 2020-08-28
Payer: MEDICARE

## 2020-08-28 DIAGNOSIS — C49.9 MALIGNANT NEOPLASM OF CONNECTIVE AND SOFT TISSUE, UNSPECIFIED: Chronic | ICD-10-CM

## 2020-08-28 DIAGNOSIS — Z01.419 ENCOUNTER FOR GYNECOLOGICAL EXAMINATION (GENERAL) (ROUTINE) WITHOUT ABNORMAL FINDINGS: ICD-10-CM

## 2020-08-28 PROCEDURE — 77067 SCR MAMMO BI INCL CAD: CPT

## 2020-08-28 PROCEDURE — 77063 BREAST TOMOSYNTHESIS BI: CPT | Mod: 26

## 2020-08-28 PROCEDURE — 77067 SCR MAMMO BI INCL CAD: CPT | Mod: 26

## 2020-08-28 PROCEDURE — 77063 BREAST TOMOSYNTHESIS BI: CPT

## 2020-09-18 ENCOUNTER — APPOINTMENT (OUTPATIENT)
Dept: OBGYN | Facility: CLINIC | Age: 70
End: 2020-09-18
Payer: MEDICARE

## 2020-09-18 VITALS — DIASTOLIC BLOOD PRESSURE: 89 MMHG | SYSTOLIC BLOOD PRESSURE: 148 MMHG

## 2020-09-18 DIAGNOSIS — N95.2 POSTMENOPAUSAL ATROPHIC VAGINITIS: ICD-10-CM

## 2020-09-18 DIAGNOSIS — N39.3 STRESS INCONTINENCE (FEMALE) (MALE): ICD-10-CM

## 2020-09-18 DIAGNOSIS — Z85.9 PERSONAL HISTORY OF MALIGNANT NEOPLASM, UNSPECIFIED: ICD-10-CM

## 2020-09-18 DIAGNOSIS — Z01.419 ENCOUNTER FOR GYNECOLOGICAL EXAMINATION (GENERAL) (ROUTINE) W/OUT ABNORMAL FINDINGS: ICD-10-CM

## 2020-09-18 PROCEDURE — G0101: CPT

## 2020-09-18 PROCEDURE — 99213 OFFICE O/P EST LOW 20 MIN: CPT

## 2020-09-18 NOTE — PHYSICAL EXAM
[No Acute Distress] : no acute distress [No Lymphadenopathy] : no lymphadenopathy [Soft] : soft [No Lesions] : no lesions [Oriented x3] : oriented x3 [Examination Of The Breasts] : a normal appearance [No Masses] : no breast masses were palpable [Labia Majora] : normal [Labia Minora] : normal [Atrophy] : atrophy [Normal] : normal [Uterine Adnexae] : normal [FreeTextEntry4] : small stiff introitus

## 2020-09-18 NOTE — HISTORY OF PRESENT ILLNESS
[FreeTextEntry1] : 71yo  PM woman here for exam w PMH: cancer, vaginal atrophy and remote h/o incontinence\par nml pap last year [Patient reported mammogram was normal] : Patient reported mammogram was normal [postmenopausal] : postmenopausal [Mammogramdate] : 8/2020 [PapSmeardate] : 2019 [ColonoscopyDate] : 2019

## 2020-09-18 NOTE — COUNSELING
[Nutrition/ Exercise/ Weight Management] : nutrition, exercise, weight management [Vitamins/Supplements] : vitamins/supplements [Bladder Hygiene] : bladder hygiene [Confidentiality] : confidentiality

## 2020-10-28 ENCOUNTER — APPOINTMENT (OUTPATIENT)
Dept: GASTROENTEROLOGY | Facility: CLINIC | Age: 70
End: 2020-10-28
Payer: MEDICARE

## 2020-10-28 VITALS
SYSTOLIC BLOOD PRESSURE: 140 MMHG | BODY MASS INDEX: 30.18 KG/M2 | OXYGEN SATURATION: 98 % | HEART RATE: 86 BPM | DIASTOLIC BLOOD PRESSURE: 80 MMHG | HEIGHT: 62 IN | WEIGHT: 164 LBS | TEMPERATURE: 97.8 F

## 2020-10-28 DIAGNOSIS — D12.6 BENIGN NEOPLASM OF COLON, UNSPECIFIED: ICD-10-CM

## 2020-10-28 PROCEDURE — 99213 OFFICE O/P EST LOW 20 MIN: CPT

## 2020-10-28 NOTE — HISTORY OF PRESENT ILLNESS
[FreeTextEntry1] : carol Presents for a followup visit. She states to feel well. She denies abdominal pain, nausea or vomiting. She denies changes in bowel habits. She denies rectal bleeding or melena. She denies weight loss or anemia. She denies family history of colon cancer. She underwent a screening colonoscopy July of 2019 with good colonic preparation and a small tubular adenoma. I explained to her the meaning of this colon polyp. I recommend a surveillance colonoscopy in 3-5 years.

## 2020-10-28 NOTE — ASSESSMENT
[FreeTextEntry1] : This is a pleasant 70-year-old female found to have a small tubular adenoma by colonoscopy a year ago. She is recommended a surveillance colonoscopy within the next 2-4 years. She is instructed to call me or come back for a visit if she develops alarming symptoms such as unexplained abdominal pain, change in bowel habits, bleeding from the rectum, black stools, unexplained weight loss or anemia. Otherwise she is to call if she has any questions or concerns.

## 2020-11-30 NOTE — ED PROVIDER NOTE - NS ED ATTENDING STATEMENT MOD
I have personally seen and examined this patient.  I have fully participated in the care of this patient. I have reviewed all pertinent clinical information, including history, physical exam, plan and the Resident’s note and agree except as noted.
No lymphadedenopathy

## 2021-02-12 ENCOUNTER — TRANSCRIPTION ENCOUNTER (OUTPATIENT)
Age: 71
End: 2021-02-12

## 2021-04-12 ENCOUNTER — NON-APPOINTMENT (OUTPATIENT)
Age: 71
End: 2021-04-12

## 2021-05-03 ENCOUNTER — APPOINTMENT (OUTPATIENT)
Dept: OBGYN | Facility: CLINIC | Age: 71
End: 2021-05-03
Payer: MEDICARE

## 2021-05-03 VITALS — DIASTOLIC BLOOD PRESSURE: 70 MMHG | WEIGHT: 163 LBS | SYSTOLIC BLOOD PRESSURE: 140 MMHG | BODY MASS INDEX: 29.81 KG/M2

## 2021-05-03 VITALS — TEMPERATURE: 95.7 F

## 2021-05-03 PROCEDURE — G0101: CPT

## 2021-05-03 NOTE — PHYSICAL EXAM
[Appropriately responsive] : appropriately responsive [Alert] : alert [No Acute Distress] : no acute distress [No Lymphadenopathy] : no lymphadenopathy [Regular Rate Rhythm] : regular rate rhythm [No Murmurs] : no murmurs [Clear to Auscultation B/L] : clear to auscultation bilaterally [Soft] : soft [Non-tender] : non-tender [Non-distended] : non-distended [No HSM] : No HSM [No Lesions] : no lesions [No Mass] : no mass [Oriented x3] : oriented x3 [Examination Of The Breasts] : a normal appearance [No Masses] : no breast masses were palpable [Labia Majora] : normal [Labia Minora] : normal [Atrophy] : atrophy [Normal] : normal [Uterine Adnexae] : normal [FreeTextEntry5] : very posterior, nearly flush w vaginal

## 2021-05-03 NOTE — PLAN
[FreeTextEntry1] : well woman\par \par stress HTN noted at visit, pt states " I need to be better about that"

## 2021-05-03 NOTE — HISTORY OF PRESENT ILLNESS
[FreeTextEntry1] : 70yo PM   here for annual  w PMH cancer and vaginal atrophy\par \par no GYM complaints [PapSmeardate] : 2019 [ColonoscopyDate] : 2019

## 2021-05-05 LAB — CYTOLOGY CVX/VAG DOC THIN PREP: ABNORMAL

## 2021-05-25 NOTE — CONSULT NOTE ADULT - SUBJECTIVE AND OBJECTIVE BOX
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 Drainage started three weeks ago after pt removed a "strand-like protrusion" from the area. Drainage was white chalk-like initially, but now has turned yellow and has a strong odor. Ultrasound and blood work was done at [facility] on Thursday. No evidence of abscess at that time. Pt has been on augmentin since Monday 10/28. The drainage amount had dec since monday, but the smell has worsened. Pt endorses fever of 100.8 since last Sunday and has been coming back intermittently throughout the last week. Last time she had a fever was this AM. The patient endorses warmth, swelling and sharp pain in the LLE now. Skin feels very sensistive. No nsaids today, has taken then occ throughout the week. (02 Nov 2019 19:13)    Ms. Ashley was encountered on an alternating air with low air loss surface. She was seen with Dr. Bland. She reported that she had a liposarcoma on her Left lateral leg that recurred twice after resection. She was treated with both chemo and XRT and had free flap reconstructive surgery. All of this treatment was at [facility] in the 1990's. She further reported that she suffered a dropped left foot following these treatments and surgeries for which she wore a brace for some time. The present wound is where the leg brace hit the leg when she wore it. She no longer wears the leg brace and her foot is not dropped and is fully functional at this time.     Wound tissue obtained and sent for pathology. Wound culture obtained.    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 3.375 Gram(s) IV Intermittent every 8 hours  sertraline 50 milliGRAM(s) Oral daily  vancomycin  IVPB 500 milliGRAM(s) IV Intermittent every 12 hours    MEDICATIONS  (PRN):    Allergies    Compazine (Swelling)    Intolerances    SOCIAL HISTORY:  Denies smoking, ETOH, drugs    FAMILY HISTORY: non contributory    Vital Signs Last 24 Hrs  T(C): 36.8 (04 Nov 2019 08:47), Max: 37.4 (04 Nov 2019 01:01)  T(F): 98.3 (04 Nov 2019 08:47), Max: 99.3 (04 Nov 2019 01:01)  HR: 76 (04 Nov 2019 08:47) (76 - 79)  BP: 145/85 (04 Nov 2019 08:47) (120/77 - 145/85)  BP(mean): --  RR: 18 (04 Nov 2019 08:47) (18 - 18)  SpO2: 96% (04 Nov 2019 08:47) (96% - 97%)    Physical Exam:  General: NAD, WN/ WG  Respiratory: no SOB on room air  Gastrointestinal: soft NT/ND  Neurology  sensation grossly intact  Musculoskeletal: no contractures, Left lateral lower leg scar, FROM  Vascular: no BLE edema, DP/PT pulses palpable  Skin:  Left lateral lower leg wound - L 1cm x W 1cm x D 0.5cm with undermining at 9 oclock to 3cm, + induration, scant serosanguinous drainage  No odor, erythema, increased warmth, tenderness, fluctuance    LABS:  11-04    138  |  105  |  12  ----------------------------<  93  4.1   |  25  |  0.59    Ca    8.6      04 Nov 2019 07:14    TPro  7.3  /  Alb  3.6  /  TBili  0.5  /  DBili  x   /  AST  15  /  ALT  12  /  AlkPhos  53  11-03                          10.8   5.69  )-----------( 261      ( 03 Nov 2019 09:13 )             33.0           RADIOLOGY & ADDITIONAL STUDIES:      EXAM:  CT LWR EXT IC LT                        PROCEDURE DATE:  11/02/2019    INTERPRETATION:    CT OF THE LEFT LEG WITH CONTRAST     CLINICAL INFORMATION: Nonhealing wound of the left leg. Evaluate for   abscess and osteomyelitis.  TECHNIQUE: Axial CT images were obtained of the left leg from the distal   femur through the foot with coronal and sagittal reconstructions. 90 cc   Omnipaque 350 was administered intravenously.    FINDINGS:    There is a large open wound at the lateral aspect of the lower leg at the   level of the proximal third of the fibula laterally. This wound is filled   with radiopaque material at its base with overlying and adjacent soft   tissue swelling. There is no peripherally enhancing encapsulated fluid   collection to suggest the presence of abscess.    There is fatty atrophy of the anterior musculature throughout the left   lower leg likely secondary to denervation.    There is a speckled appearing lesion within the medullary cavity of the   lateral femoral condyle likely consistent with an enchondroma. There are   no aggressive features identified.    There are no CT findings of osteomyelitis.    IMPRESSION:    Large open wound filled with radiopaque material at the lateral aspect of   the lower leg at the level of the proximal third of the fibula with   prominent adjacent soft tissue swelling but without evidence of a   distinctly encapsulated peripherally enhancing fluid collection to   suggest an encapsulated abscess. No CT evidence of osteomyelitis.    EXAM:  TIBIA AND FIBULA AP AND LAT LT                        PROCEDURE DATE:  11/02/2019    INTERPRETATION:  CLINICAL INFORMATION: Left lower extremity pain with   chronic draining wound, concern for subcutaneous emphysema    COMPARISON:  CT left lower extremity from the same day    TECHNIQUE:   2 views of the left tibia/fibula    FINDINGS:     Soft tissue defect within the lateral left leg with porus radiodense   foreign body, possibly packing material.    No fracture or dislocation.    Cultures:    11.3.2019 surgical swab cx - + moderate gram neg rods, moderate pseudomonas, moderate citrobacter koseri  11.2.2019 urine cx - neg  11.2.2019 blood cx x2 - no growth to date no cough/no wheezing/no exertional dyspnea/no hemoptysis/no orthopnea/no shortness of breath

## 2021-08-24 DIAGNOSIS — Z00.00 ENCOUNTER FOR GENERAL ADULT MEDICAL EXAMINATION W/OUT ABNORMAL FINDINGS: ICD-10-CM

## 2021-08-31 ENCOUNTER — RESULT REVIEW (OUTPATIENT)
Age: 71
End: 2021-08-31

## 2021-08-31 ENCOUNTER — APPOINTMENT (OUTPATIENT)
Dept: MAMMOGRAPHY | Facility: IMAGING CENTER | Age: 71
End: 2021-08-31
Payer: MEDICARE

## 2021-08-31 ENCOUNTER — APPOINTMENT (OUTPATIENT)
Dept: ULTRASOUND IMAGING | Facility: IMAGING CENTER | Age: 71
End: 2021-08-31
Payer: MEDICARE

## 2021-08-31 ENCOUNTER — OUTPATIENT (OUTPATIENT)
Dept: OUTPATIENT SERVICES | Facility: HOSPITAL | Age: 71
LOS: 1 days | End: 2021-08-31
Payer: MEDICARE

## 2021-08-31 DIAGNOSIS — R92.2 INCONCLUSIVE MAMMOGRAM: ICD-10-CM

## 2021-08-31 DIAGNOSIS — C49.9 MALIGNANT NEOPLASM OF CONNECTIVE AND SOFT TISSUE, UNSPECIFIED: Chronic | ICD-10-CM

## 2021-08-31 DIAGNOSIS — Z00.00 ENCOUNTER FOR GENERAL ADULT MEDICAL EXAMINATION WITHOUT ABNORMAL FINDINGS: ICD-10-CM

## 2021-08-31 PROCEDURE — 77067 SCR MAMMO BI INCL CAD: CPT | Mod: 26

## 2021-08-31 PROCEDURE — 77063 BREAST TOMOSYNTHESIS BI: CPT | Mod: 26

## 2021-08-31 PROCEDURE — 76641 ULTRASOUND BREAST COMPLETE: CPT | Mod: 26,50

## 2021-08-31 PROCEDURE — 77067 SCR MAMMO BI INCL CAD: CPT

## 2021-08-31 PROCEDURE — 77063 BREAST TOMOSYNTHESIS BI: CPT

## 2021-08-31 PROCEDURE — 76641 ULTRASOUND BREAST COMPLETE: CPT

## 2021-09-22 ENCOUNTER — APPOINTMENT (OUTPATIENT)
Dept: GASTROENTEROLOGY | Facility: CLINIC | Age: 71
End: 2021-09-22
Payer: MEDICARE

## 2021-09-22 VITALS
WEIGHT: 163 LBS | BODY MASS INDEX: 30 KG/M2 | TEMPERATURE: 96.9 F | SYSTOLIC BLOOD PRESSURE: 128 MMHG | OXYGEN SATURATION: 98 % | HEART RATE: 75 BPM | DIASTOLIC BLOOD PRESSURE: 80 MMHG | HEIGHT: 62 IN

## 2021-09-22 PROCEDURE — 99213 OFFICE O/P EST LOW 20 MIN: CPT

## 2021-09-22 NOTE — ASSESSMENT
[FreeTextEntry1] : This is a 71-year-old female with gas and bloating which is diet related.  I explained to her that she is eating vegetables that are gassy producing.  She is however aware of this.  I recommend modifying her diet to decrease gassy producing foods such as cabbage and broccoli.  She can take an anti-gas medication such as simethicone, Gas-X or Mylanta gas as needed.  She needs a surveillance colonoscopy next summer.  She is instructed to call me if she has any questions or concerns.

## 2021-09-22 NOTE — HISTORY OF PRESENT ILLNESS
[FreeTextEntry1] : Violet presents for follow-up visit.  She relates that she has been experiencing abdominal gas, bloating and very uncomfortable feeling.  Usually when this happens she has no changes in bowel habits.  However couple of weeks ago she had an episode of bad diarrhea after having kiwi with the skin on it.  She admits that she eats healthy and that she has a good amount of fruits and vegetables.  She has broccoli and cabbage which she notes is gassy for her but she continues to have them.  She denies having dairy products except for yogurt in the morning with fruits.  No weight loss.  Colonoscopy from July 2019 with an adenomatous colon polyp.  No family history of colon cancer or colon polyps.

## 2022-04-13 ENCOUNTER — NON-APPOINTMENT (OUTPATIENT)
Age: 72
End: 2022-04-13

## 2022-04-20 ENCOUNTER — APPOINTMENT (OUTPATIENT)
Dept: OBGYN | Facility: CLINIC | Age: 72
End: 2022-04-20
Payer: MEDICARE

## 2022-04-20 VITALS
SYSTOLIC BLOOD PRESSURE: 138 MMHG | DIASTOLIC BLOOD PRESSURE: 84 MMHG | HEIGHT: 62 IN | WEIGHT: 160 LBS | BODY MASS INDEX: 29.44 KG/M2

## 2022-04-20 PROCEDURE — 81003 URINALYSIS AUTO W/O SCOPE: CPT | Mod: QW

## 2022-04-20 PROCEDURE — 99214 OFFICE O/P EST MOD 30 MIN: CPT

## 2022-04-20 NOTE — PLAN
[FreeTextEntry1] : pain \par \par sono\par PT \par consider urogyn\par consider ortho for back and radiating leg pain

## 2022-04-20 NOTE — HISTORY OF PRESENT ILLNESS
[FreeTextEntry1] : 71yo PM woman  here w h/o intermittent pelvic pain now w new daily/consistent  pelvic pain in suprapubic region\par Pt w h/o chronic intermittent back pain\par \par pelvic pain worse w back pain, standing or sitting too long that radiates down left leg\par \par note no pain today\par \par no change in BMs or urination\par no F/C\par no bloating\par no change in appetite\par \par \par In midst of divorce, spouse requesting alimony   in setting of prior prenuptial agreement

## 2022-04-20 NOTE — PHYSICAL EXAM
[Chaperone Declined] : Patient declined chaperone [Appropriately responsive] : appropriately responsive [Soft] : soft [Non-tender] : non-tender [No Lesions] : no lesions [Oriented x3] : oriented x3 [Labia Majora] : normal [Labia Minora] : normal [Normal] : normal [Uterine Adnexae] : normal

## 2022-04-20 NOTE — REVIEW OF SYSTEMS
[Dysuria] : no dysuria [Abn Vaginal bleeding] : no abnormal vaginal bleeding [Pelvic pain] : pelvic pain [Negative] : Heme/Lymph

## 2022-04-21 DIAGNOSIS — B96.89 ACUTE VAGINITIS: ICD-10-CM

## 2022-04-21 DIAGNOSIS — N76.0 ACUTE VAGINITIS: ICD-10-CM

## 2022-04-21 LAB
APPEARANCE: CLEAR
BACTERIA UR CULT: NORMAL
BACTERIA: NEGATIVE
BILIRUB UR QL STRIP: NORMAL
BILIRUBIN URINE: NEGATIVE
BLOOD URINE: NEGATIVE
CANDIDA VAG CYTO: NOT DETECTED
CLARITY UR: NORMAL
COLLECTION METHOD: NORMAL
COLOR: COLORLESS
G VAGINALIS+PREV SP MTYP VAG QL MICRO: DETECTED
GLUCOSE QUALITATIVE U: NEGATIVE
GLUCOSE UR-MCNC: NORMAL
HCG UR QL: 0.2 EU/DL
HGB UR QL STRIP.AUTO: NORMAL
HYALINE CASTS: 1 /LPF
KETONES UR-MCNC: NORMAL
KETONES URINE: NEGATIVE
LEUKOCYTE ESTERASE UR QL STRIP: NORMAL
LEUKOCYTE ESTERASE URINE: NEGATIVE
MICROSCOPIC-UA: NORMAL
NITRITE UR QL STRIP: NORMAL
NITRITE URINE: NEGATIVE
PH UR STRIP: 7.5
PH URINE: 7.5
PROT UR STRIP-MCNC: NORMAL
PROTEIN URINE: NEGATIVE
RED BLOOD CELLS URINE: 2 /HPF
SP GR UR STRIP: 1.01
SPECIFIC GRAVITY URINE: 1.01
SQUAMOUS EPITHELIAL CELLS: 0 /HPF
T VAGINALIS VAG QL WET PREP: NOT DETECTED
UROBILINOGEN URINE: NORMAL
WHITE BLOOD CELLS URINE: 0 /HPF

## 2022-04-22 ENCOUNTER — NON-APPOINTMENT (OUTPATIENT)
Age: 72
End: 2022-04-22

## 2022-06-02 ENCOUNTER — INPATIENT (INPATIENT)
Facility: HOSPITAL | Age: 72
LOS: 3 days | Discharge: HOME HEALTH | End: 2022-06-06
Attending: INTERNAL MEDICINE | Admitting: INTERNAL MEDICINE
Payer: MEDICARE

## 2022-06-02 VITALS
HEART RATE: 64 BPM | TEMPERATURE: 98 F | OXYGEN SATURATION: 98 % | DIASTOLIC BLOOD PRESSURE: 56 MMHG | SYSTOLIC BLOOD PRESSURE: 115 MMHG | RESPIRATION RATE: 15 BRPM | HEIGHT: 62 IN

## 2022-06-02 DIAGNOSIS — I10 ESSENTIAL (PRIMARY) HYPERTENSION: ICD-10-CM

## 2022-06-02 DIAGNOSIS — S32.019A UNSPECIFIED FRACTURE OF FIRST LUMBAR VERTEBRA, INITIAL ENCOUNTER FOR CLOSED FRACTURE: ICD-10-CM

## 2022-06-02 DIAGNOSIS — F41.1 GENERALIZED ANXIETY DISORDER: ICD-10-CM

## 2022-06-02 DIAGNOSIS — Z29.9 ENCOUNTER FOR PROPHYLACTIC MEASURES, UNSPECIFIED: ICD-10-CM

## 2022-06-02 DIAGNOSIS — C49.9 MALIGNANT NEOPLASM OF CONNECTIVE AND SOFT TISSUE, UNSPECIFIED: Chronic | ICD-10-CM

## 2022-06-02 LAB
ALBUMIN SERPL ELPH-MCNC: 3.9 G/DL — SIGNIFICANT CHANGE UP (ref 3.3–5)
ALP SERPL-CCNC: 52 U/L — SIGNIFICANT CHANGE UP (ref 40–120)
ALT FLD-CCNC: 33 U/L — SIGNIFICANT CHANGE UP (ref 4–33)
ANION GAP SERPL CALC-SCNC: 12 MMOL/L — SIGNIFICANT CHANGE UP (ref 7–14)
APTT BLD: 25.5 SEC — LOW (ref 27–36.3)
AST SERPL-CCNC: 125 U/L — HIGH (ref 4–32)
BASOPHILS # BLD AUTO: 0.04 K/UL — SIGNIFICANT CHANGE UP (ref 0–0.2)
BASOPHILS NFR BLD AUTO: 0.6 % — SIGNIFICANT CHANGE UP (ref 0–2)
BILIRUB SERPL-MCNC: 0.7 MG/DL — SIGNIFICANT CHANGE UP (ref 0.2–1.2)
BLD GP AB SCN SERPL QL: NEGATIVE — SIGNIFICANT CHANGE UP
BUN SERPL-MCNC: 19 MG/DL — SIGNIFICANT CHANGE UP (ref 7–23)
CALCIUM SERPL-MCNC: 9 MG/DL — SIGNIFICANT CHANGE UP (ref 8.4–10.5)
CHLORIDE SERPL-SCNC: 103 MMOL/L — SIGNIFICANT CHANGE UP (ref 98–107)
CO2 SERPL-SCNC: 19 MMOL/L — LOW (ref 22–31)
CREAT SERPL-MCNC: 0.53 MG/DL — SIGNIFICANT CHANGE UP (ref 0.5–1.3)
EGFR: 98 ML/MIN/1.73M2 — SIGNIFICANT CHANGE UP
EOSINOPHIL # BLD AUTO: 0.04 K/UL — SIGNIFICANT CHANGE UP (ref 0–0.5)
EOSINOPHIL NFR BLD AUTO: 0.6 % — SIGNIFICANT CHANGE UP (ref 0–6)
GLUCOSE SERPL-MCNC: 106 MG/DL — HIGH (ref 70–99)
HCT VFR BLD CALC: 37.9 % — SIGNIFICANT CHANGE UP (ref 34.5–45)
HGB BLD-MCNC: 11.8 G/DL — SIGNIFICANT CHANGE UP (ref 11.5–15.5)
IANC: 5.02 K/UL — SIGNIFICANT CHANGE UP (ref 1.8–7.4)
IMM GRANULOCYTES NFR BLD AUTO: 0.4 % — SIGNIFICANT CHANGE UP (ref 0–1.5)
INR BLD: 1.1 RATIO — SIGNIFICANT CHANGE UP (ref 0.88–1.16)
LYMPHOCYTES # BLD AUTO: 1.67 K/UL — SIGNIFICANT CHANGE UP (ref 1–3.3)
LYMPHOCYTES # BLD AUTO: 23.2 % — SIGNIFICANT CHANGE UP (ref 13–44)
MCHC RBC-ENTMCNC: 29.7 PG — SIGNIFICANT CHANGE UP (ref 27–34)
MCHC RBC-ENTMCNC: 31.1 GM/DL — LOW (ref 32–36)
MCV RBC AUTO: 95.5 FL — SIGNIFICANT CHANGE UP (ref 80–100)
MONOCYTES # BLD AUTO: 0.41 K/UL — SIGNIFICANT CHANGE UP (ref 0–0.9)
MONOCYTES NFR BLD AUTO: 5.7 % — SIGNIFICANT CHANGE UP (ref 2–14)
NEUTROPHILS # BLD AUTO: 5.02 K/UL — SIGNIFICANT CHANGE UP (ref 1.8–7.4)
NEUTROPHILS NFR BLD AUTO: 69.5 % — SIGNIFICANT CHANGE UP (ref 43–77)
NRBC # BLD: 0 /100 WBCS — SIGNIFICANT CHANGE UP
NRBC # FLD: 0 K/UL — SIGNIFICANT CHANGE UP
PLATELET # BLD AUTO: 171 K/UL — SIGNIFICANT CHANGE UP (ref 150–400)
POTASSIUM SERPL-MCNC: SIGNIFICANT CHANGE UP MMOL/L (ref 3.5–5.3)
POTASSIUM SERPL-SCNC: SIGNIFICANT CHANGE UP MMOL/L (ref 3.5–5.3)
PROT SERPL-MCNC: SIGNIFICANT CHANGE UP G/DL (ref 6–8.3)
PROTHROM AB SERPL-ACNC: 12.8 SEC — SIGNIFICANT CHANGE UP (ref 10.5–13.4)
RBC # BLD: 3.97 M/UL — SIGNIFICANT CHANGE UP (ref 3.8–5.2)
RBC # FLD: 13.3 % — SIGNIFICANT CHANGE UP (ref 10.3–14.5)
RH IG SCN BLD-IMP: POSITIVE — SIGNIFICANT CHANGE UP
SARS-COV-2 RNA SPEC QL NAA+PROBE: SIGNIFICANT CHANGE UP
SODIUM SERPL-SCNC: 134 MMOL/L — LOW (ref 135–145)
WBC # BLD: 7.21 K/UL — SIGNIFICANT CHANGE UP (ref 3.8–10.5)
WBC # FLD AUTO: 7.21 K/UL — SIGNIFICANT CHANGE UP (ref 3.8–10.5)

## 2022-06-02 PROCEDURE — 72131 CT LUMBAR SPINE W/O DYE: CPT | Mod: 26,MG

## 2022-06-02 PROCEDURE — G1004: CPT

## 2022-06-02 PROCEDURE — 93010 ELECTROCARDIOGRAM REPORT: CPT

## 2022-06-02 PROCEDURE — 99223 1ST HOSP IP/OBS HIGH 75: CPT | Mod: GC,AI

## 2022-06-02 PROCEDURE — 99285 EMERGENCY DEPT VISIT HI MDM: CPT | Mod: FS,25

## 2022-06-02 RX ORDER — LIDOCAINE 4 G/100G
1 CREAM TOPICAL ONCE
Refills: 0 | Status: COMPLETED | OUTPATIENT
Start: 2022-06-02 | End: 2022-06-02

## 2022-06-02 RX ORDER — OXYCODONE HYDROCHLORIDE 5 MG/1
5 TABLET ORAL ONCE
Refills: 0 | Status: DISCONTINUED | OUTPATIENT
Start: 2022-06-02 | End: 2022-06-02

## 2022-06-02 RX ORDER — SERTRALINE 25 MG/1
1 TABLET, FILM COATED ORAL
Qty: 0 | Refills: 0 | DISCHARGE

## 2022-06-02 RX ORDER — SERTRALINE 25 MG/1
25 TABLET, FILM COATED ORAL DAILY
Refills: 0 | Status: DISCONTINUED | OUTPATIENT
Start: 2022-06-02 | End: 2022-06-02

## 2022-06-02 RX ORDER — SERTRALINE 25 MG/1
20 TABLET, FILM COATED ORAL
Qty: 0 | Refills: 0 | DISCHARGE

## 2022-06-02 RX ORDER — ACETAMINOPHEN 500 MG
650 TABLET ORAL EVERY 6 HOURS
Refills: 0 | Status: DISCONTINUED | OUTPATIENT
Start: 2022-06-02 | End: 2022-06-06

## 2022-06-02 RX ORDER — KETOROLAC TROMETHAMINE 30 MG/ML
15 SYRINGE (ML) INJECTION ONCE
Refills: 0 | Status: DISCONTINUED | OUTPATIENT
Start: 2022-06-02 | End: 2022-06-02

## 2022-06-02 RX ORDER — HYDROCHLOROTHIAZIDE 25 MG
25 TABLET ORAL DAILY
Refills: 0 | Status: DISCONTINUED | OUTPATIENT
Start: 2022-06-02 | End: 2022-06-06

## 2022-06-02 RX ORDER — KETOROLAC TROMETHAMINE 30 MG/ML
15 SYRINGE (ML) INJECTION EVERY 6 HOURS
Refills: 0 | Status: DISCONTINUED | OUTPATIENT
Start: 2022-06-02 | End: 2022-06-05

## 2022-06-02 RX ORDER — SENNA PLUS 8.6 MG/1
2 TABLET ORAL AT BEDTIME
Refills: 0 | Status: DISCONTINUED | OUTPATIENT
Start: 2022-06-02 | End: 2022-06-06

## 2022-06-02 RX ORDER — GABAPENTIN 400 MG/1
300 CAPSULE ORAL
Refills: 0 | Status: DISCONTINUED | OUTPATIENT
Start: 2022-06-02 | End: 2022-06-02

## 2022-06-02 RX ORDER — LOSARTAN POTASSIUM 100 MG/1
100 TABLET, FILM COATED ORAL DAILY
Refills: 0 | Status: DISCONTINUED | OUTPATIENT
Start: 2022-06-02 | End: 2022-06-06

## 2022-06-02 RX ORDER — SERTRALINE 25 MG/1
50 TABLET, FILM COATED ORAL DAILY
Refills: 0 | Status: DISCONTINUED | OUTPATIENT
Start: 2022-06-02 | End: 2022-06-06

## 2022-06-02 RX ORDER — DILTIAZEM HCL 120 MG
120 CAPSULE, EXT RELEASE 24 HR ORAL DAILY
Refills: 0 | Status: DISCONTINUED | OUTPATIENT
Start: 2022-06-02 | End: 2022-06-03

## 2022-06-02 RX ORDER — GABAPENTIN 400 MG/1
600 CAPSULE ORAL AT BEDTIME
Refills: 0 | Status: DISCONTINUED | OUTPATIENT
Start: 2022-06-02 | End: 2022-06-06

## 2022-06-02 RX ORDER — ENOXAPARIN SODIUM 100 MG/ML
40 INJECTION SUBCUTANEOUS EVERY 24 HOURS
Refills: 0 | Status: DISCONTINUED | OUTPATIENT
Start: 2022-06-02 | End: 2022-06-06

## 2022-06-02 RX ORDER — OXYCODONE HYDROCHLORIDE 5 MG/1
2.5 TABLET ORAL EVERY 6 HOURS
Refills: 0 | Status: DISCONTINUED | OUTPATIENT
Start: 2022-06-02 | End: 2022-06-06

## 2022-06-02 RX ORDER — CYCLOBENZAPRINE HYDROCHLORIDE 10 MG/1
5 TABLET, FILM COATED ORAL ONCE
Refills: 0 | Status: COMPLETED | OUTPATIENT
Start: 2022-06-02 | End: 2022-06-02

## 2022-06-02 RX ORDER — GABAPENTIN 400 MG/1
300 CAPSULE ORAL DAILY
Refills: 0 | Status: DISCONTINUED | OUTPATIENT
Start: 2022-06-02 | End: 2022-06-06

## 2022-06-02 RX ORDER — INFLUENZA VIRUS VACCINE 15; 15; 15; 15 UG/.5ML; UG/.5ML; UG/.5ML; UG/.5ML
0.7 SUSPENSION INTRAMUSCULAR ONCE
Refills: 0 | Status: DISCONTINUED | OUTPATIENT
Start: 2022-06-02 | End: 2022-06-06

## 2022-06-02 RX ORDER — ACETAMINOPHEN 500 MG
975 TABLET ORAL ONCE
Refills: 0 | Status: COMPLETED | OUTPATIENT
Start: 2022-06-02 | End: 2022-06-02

## 2022-06-02 RX ADMIN — Medication 15 MILLIGRAM(S): at 10:36

## 2022-06-02 RX ADMIN — SERTRALINE 50 MILLIGRAM(S): 25 TABLET, FILM COATED ORAL at 22:36

## 2022-06-02 RX ADMIN — Medication 15 MILLIGRAM(S): at 18:38

## 2022-06-02 RX ADMIN — CYCLOBENZAPRINE HYDROCHLORIDE 5 MILLIGRAM(S): 10 TABLET, FILM COATED ORAL at 10:35

## 2022-06-02 RX ADMIN — Medication 650 MILLIGRAM(S): at 23:23

## 2022-06-02 RX ADMIN — GABAPENTIN 600 MILLIGRAM(S): 400 CAPSULE ORAL at 22:36

## 2022-06-02 RX ADMIN — Medication 15 MILLIGRAM(S): at 10:50

## 2022-06-02 RX ADMIN — Medication 975 MILLIGRAM(S): at 11:30

## 2022-06-02 RX ADMIN — Medication 975 MILLIGRAM(S): at 10:35

## 2022-06-02 RX ADMIN — Medication 120 MILLIGRAM(S): at 19:01

## 2022-06-02 RX ADMIN — Medication 15 MILLIGRAM(S): at 19:05

## 2022-06-02 NOTE — H&P ADULT - NSHPSOCIALHISTORY_GEN_ALL_CORE
Patient lives alone but, with mother-daughter house with daughter and grandkids. Takes care of Her older sister with Dementia. Is a retired banker CPA. Patient is baseline independent and ambulates on her own without devices.     Tobacco: Denied  Alcohol: Prior social   Drugs: Denies.

## 2022-06-02 NOTE — ED PROVIDER NOTE - CLINICAL SUMMARY MEDICAL DECISION MAKING FREE TEXT BOX
Pt is a 71 y/o F PMHx HTN, left foot drop due to liposarcoma s/p excision p/w fall 2 hours ago. -- likely musculoskeletal back pain w/o acute neurological deficits; r/o fracture; not clinically concerning for ICH, not clinically concerning for c-spine fracture/dislocation -- labs, ct lumbosacral spine, pain control

## 2022-06-02 NOTE — PATIENT PROFILE ADULT - OVER THE PAST TWO WEEKS, HAVE YOU FELT LITTLE INTEREST OR PLEASURE IN DOING THINGS?
Discharge Planning Assessment  [facility]     Patient Name: Hanny Ivy  MRN: [id number]  Today's Date: 3/7/2018    Admit Date: 3/6/2018          Discharge Needs Assessment       03/07/18 1531    Living Environment    Lives With spouse    Living Arrangements house    Transportation Available family or friend will provide;car    Living Environment    Primary Care Provided By child(germaine) (specify)    Quality Of Family Relationships supportive;helpful;involved    Able to Return to Prior Living Arrangements yes    Discharge Needs Assessment    Concerns To Be Addressed adjustment to diagnosis/illness concerns    Readmission Within The Last 30 Days no previous admission in last 30 days    Outpatient/Agency/Support Group Needs homecare agency (specify level of care)    Equipment Currently Used at Home walker, rolling;wheelchair;bath bench    Discharge Facility/Level Of Care Needs home with home health    Discharge Planning Comments SW spoke to patient in room re discharge planning. Patient lives with her  but states that she has a son and a daughter that are both able to take off of work to assist her if needed at discharge. Patient does agree to home health if MD feels necessary. SW will follow.             Discharge Plan     None        Discharge Placement     No information found                Demographic Summary     None            Functional Status     None            Psychosocial     None            Abuse/Neglect     None            Legal     None            Substance Abuse     None            Patient Forms     None          TJ WallisW     no

## 2022-06-02 NOTE — H&P ADULT - NSICDXPASTSURGICALHX_GEN_ALL_CORE_FT
PAST SURGICAL HISTORY:  Primary liposarcoma of soft tissue of extremity W residual L foot drop since surgeries.

## 2022-06-02 NOTE — ED PROVIDER NOTE - OBJECTIVE STATEMENT
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  Pt reports while descending stairs, carrying several times, pt tripped falling down ~4 steps, with back striking wall and back striking floor without head trauma or LOC.  Pt reports sudden onset severe lower nonradiating back pain, aching, which worsens with movement and improves with laying still.  Pt reports after experiencing pain, developed lightheadedness and nausea.  Pt denies any fevers, chills, numbness, weakness, paresthesias, headache, neck pain, chest pain, sob, abdominal pain, changes in vision/hearing, use of blood thinners, room spinning dizziness, difficulty voiding, difficulty passing bowel movements, incontinence.  Pt reports she has not been able to walk independently since fall.

## 2022-06-02 NOTE — H&P ADULT - NSHPREVIEWOFSYSTEMS_GEN_ALL_CORE
CONSTITUTIONAL:  No weight loss, fever, chills, weakness or fatigue.  HEENT:  Eyes:  No visual loss, blurred vision, double vision or yellow sclerae. Ears, Nose, Throat:  No hearing loss, sneezing, congestion, runny nose or sore throat.  SKIN:  No rash or itching.  CARDIOVASCULAR:  No chest pain, chest pressure or chest discomfort. No palpitations.  RESPIRATORY:  No shortness of breath, cough or sputum.  GASTROINTESTINAL:  + nausea, no abdominal pain.   GENITOURINARY:  Denies hematuria, dysuria.   NEUROLOGICAL:  No headache, dizziness, syncope, paralysis, ataxia, numbness or tingling in the extremities. No change in bowel or bladder control.  MUSCULOSKELETAL:  + back pain   HEMATOLOGIC:  No anemia, bleeding or bruising.  LYMPHATICS:  No enlarged nodes.   PSYCHIATRIC:  + hx anxiety  ENDOCRINOLOGIC:  No reports of sweating, cold or heat intolerance. No polyuria or polydipsia.  ALLERGIES:  No history of asthma, hives, eczema or rhinitis.

## 2022-06-02 NOTE — H&P ADULT - ASSESSMENT
72 yof PMHx HTN (On losartan, HCTZ, diltiazem), left foot drop due to liposarcoma resection, anxiety (on sertraline and gabapentin), osteoporosis, presents with acute fall likely mechanical, found to have CT Lumbar Spine showing L1 endplate fracture, evaluated by Neurosurgery who recommended no acute surgical intervention admitted for mechanical fall, PT evaluation, and pain management.

## 2022-06-02 NOTE — H&P ADULT - NSICDXFAMILYHX_GEN_ALL_CORE_FT
FAMILY HISTORY:  Sibling  Still living? Yes, Estimated age: 88  FH: Alzheimers disease, Age at diagnosis: Age Unknown

## 2022-06-02 NOTE — PATIENT PROFILE ADULT - FALL HARM RISK - HARM RISK INTERVENTIONS

## 2022-06-02 NOTE — ED ADULT TRIAGE NOTE - CHIEF COMPLAINT QUOTE
Pt arrives via EMS s/p fall down shmuel 5 stairs. Denies hitting head/LOC/AC use. Endorses back pain. PMH: anxiety, HTN.

## 2022-06-02 NOTE — ED ADULT NURSE NOTE - OBJECTIVE STATEMENT
Pt presents after fall down 5 stairs injuring back, endorses pain, denies radiating to legs/arms, breathing even and unlabored, denies chest pain, no head trauma, no LOC, no AC use. Pt accompanied with daughter at bedside, MD evaluating, awaiting further orders, will continue to monitor.

## 2022-06-02 NOTE — ED PROVIDER NOTE - PROGRESS NOTE DETAILS
HOLLIS YANES:  CT with following impression: "Acute L1 superior endplate fracture with sparing of the   pedicles and lateral and posterior elements. Roughly 20% canal compromise   on the basis of retropulsion of superior posterior endplate into the   canal at this level. Focal sclerosis in the T12 vertebral body suggested   on the prior MR 3/2/2016 as well may represent a bone island"  Neurosurgery consulted. HOLLIS YANES:  Neurosurgery does not recommend surgical intervention at this time.  Pt accepted for admission under Dr. Herman.  MAR text paged at this time.

## 2022-06-02 NOTE — CONSULT NOTE ADULT - PROBLEM SELECTOR RECOMMENDATION 9
- No acute neurosurgical intervention  - TLSO brace  - Physical therapy  - Outpatient follow up in 2 weeks with Dr. White    Case discussed with attending neurosurgeon Dr. White - No acute neurosurgical intervention  - TLSO brace  - Physical therapy  - Pain control  - Outpatient follow up in 2 weeks with Dr. White    Case discussed with attending neurosurgeon Dr. White

## 2022-06-02 NOTE — H&P ADULT - HISTORY OF PRESENT ILLNESS
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 Patient was rushing out of her house to catch a flight to [place] where she slipped on her way down the stairs in her apt and fell on her back. She denies head strike, LOC, syncope, lightheadedness, dizziness. The pain is mid lower back, nonradiating, stable when she stays still. Of note patient with chronic lower back pain for which she sees PT outpatient, but that is not related to this back pain. Denies palpitations, CP, HA, SOB. She did endorse some post-fall nausea, but no vomiting.     ED Course: Vital signs unremarkable, within normal limits, stable.   Patient received Tylenol 975 x1, Toradol l15 x1, Flexeril 5 x1.   Evalauted by NSESTELLE in the ER, found to have L1 fracture on CT scan, admitted for medical pain management and PT eval as no acute surgical intervention planned.  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 Patient was rushing out of her house to catch a flight to [place] where she slipped on her way down the stairs in her apt and fell on her back. She denies head strike, LOC, syncope, lightheadedness, dizziness. The pain is mid lower back, nonradiating, stable when she stays still. Of note patient with chronic lower back pain for which she sees PT outpatient, but that is not related to this back pain. Denies palpitations, CP, HA, SOB. She did endorse some post-fall nausea, but no vomiting.     ED Course: Vital signs unremarkable, within normal limits, stable.   Patient received Tylenol 975 x1, Toradol l15 x1, Flexeril 5 x1.   Evalauted by NSESTELLE in the ER, found to have L1 fracture on CT scan, admitted for medical pain management and PT eval as no acute surgical intervention planned.

## 2022-06-02 NOTE — ED PROVIDER NOTE - NS CPE EDP MUSC SACRAL LOC
+midline and bilateral paraspinal tenderness at upper sacral spine; no palpable deformities; no redness; no warmth; no swelling; no crepitus

## 2022-06-02 NOTE — H&P ADULT - ATTENDING COMMENTS
Patient seen and examined on 6/2/22    71 yo F with HTN, liposarcoma s/p resection >40 years ago c/b L foot drop unchanged since 1970s, chronic lower back pain (follows with [facility]), anxiety (no recent panic attacks), presented after she was rushing to the airport and tripped and fell on her staircase at home. No LOC, head trauma, palpitations, dizziness, or seizure like activity. Full memory of event. Here found to have acute L1 fx. Evaluated by NSG, not advising surgical intervention. Continue pain control, PT evaluation. Remainder as above.

## 2022-06-02 NOTE — H&P ADULT - NSHPLABSRESULTS_GEN_ALL_CORE
Labs:                          11.8   7.21  )-----------( 171      ( 02 Jun 2022 10:20 )             37.9     06-02    134<L>  |  103  |  19  ----------------------------<  106<H>  TNP   |  19<L>  |  0.53    Ca    9.0      02 Jun 2022 10:20    TPro  TNP  /  Alb  3.9  /  TBili  0.7  /  DBili  x   /  AST  125<H>  /  ALT  33  /  AlkPhos  52  06-02    LIVER FUNCTIONS - ( 02 Jun 2022 10:20 )  Alb: 3.9 g/dL / Pro: TNP g/dL / ALK PHOS: 52 U/L / ALT: 33 U/L / AST: 125 U/L / GGT: x           PT/INR - ( 02 Jun 2022 10:20 )   PT: 12.8 sec;   INR: 1.10 ratio         PTT - ( 02 Jun 2022 10:20 )  PTT:25.5 sec  CAPILLARY BLOOD GLUCOSE    Micro:  COVID neg    RADIOLOGY & ADDITIONAL TESTS:    EKG:  Personally reviewed: NSR, ?LVH, QTc ~420     US/CT/MRI:  < from: CT Lumbar Spine No Cont (06.02.22 @ 11:08) >    FINDINGS:  VERTEBRAL BODIES AND DISCS:  Focal rounded area of sclerosis T12   vertebral body suggested on the MR 3/2/2016 as well may represent a bone  island. Evidence of an acute fracture involving the superior endplate of   L1. The pedicles are spared. Mild retropulsion of bone into the spinal   canal with about a 20% canal compromise.  ALIGNMENT:  No subluxations.  L1-L2 LEVEL:  Normal.  L2-B2EIFGV:  Normal.  L3-L4 LEVEL:  Normal.  L4-L5 LEVEL:  Left sided facet degenerative change greater than right  L5-S1 LEVEL:  Prominent right facet hypertrophic change  SPINAL CANAL:  No other intradural or extradural defects are seen.  MISCELLANEOUS: None.    IMPRESSION:  Acute L1 superior endplate fracture with sparing of the   pedicles and lateral and posterior elements. Roughly 20% canal compromise   on the basis of retropulsion of superior posterior endplate into the   canal at this level. Focal sclerosis in the T12 vertebral body suggested   on the prior MR 3/2/2016 as well may represent a bone island    < end of copied text >

## 2022-06-02 NOTE — ED PROVIDER NOTE - ATTENDING APP SHARED VISIT CONTRIBUTION OF CARE
I performed a face to face evaluation of this patient and obtained a history and performed a full exam.  I agree with the history, physical exam and plan of the PA.  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 -- likely musculoskeletal back pain w/o acute neurological deficits; r/o fracture; not clinically concerning for ICH, not clinically concerning for c-spine fracture/dislocation -- labs, ct lumbosacral spine, pain control  Back no midline deformity or ttp on my exam, pelvis stable, neck and head wnl, neuro wnl, for CT, pain meds and reassess.

## 2022-06-02 NOTE — H&P ADULT - NSHPPHYSICALEXAM_GEN_ALL_CORE
ICU Vital Signs Last 24 Hrs  T(C): 36.4 (02 Jun 2022 15:53), Max: 36.8 (02 Jun 2022 08:36)  T(F): 97.5 (02 Jun 2022 15:53), Max: 98.2 (02 Jun 2022 08:36)  HR: 86 (02 Jun 2022 15:53) (60 - 86)  BP: 137/80 (02 Jun 2022 15:53) (115/56 - 137/80)  BP(mean): --  ABP: --  ABP(mean): --  RR: 17 (02 Jun 2022 15:53) (15 - 17)  SpO2: 100% (02 Jun 2022 15:53) (98% - 100%)    GENERAL APPEARANCE: Well developed, NAD  HEENT:  PERRL, EOMI. hearing grossly intact.  NECK: Neck supple, non-tender no lymphadenopathy, masses or thyromegaly.  CARDIAC: Normal S1 and S2. no mrg. RRR  LUNGS: Clear to auscultation B/L, no rales, rhonchi, or wheezing  ABDOMEN: Soft , NTND, bowel sounds normal. No guarding or rebound.   MUSCULOSKELETAL: No focal spinal tenderness.   EXTREMITIES: No edema. Peripheral pulses intact.   NEUROLOGICAL: L foot drop. Otherwise, Non focal. Strength and sensation symmetric and intact throughout.   SKIN: Warm and dry , Well perfused  PSYCHIATRIC: AOx4 , Normal mood and affect

## 2022-06-02 NOTE — H&P ADULT - PROBLEM SELECTOR PLAN 2
Normotensive inpatient.   Home Meds: HCTZ 100qd, Losartan 25qd, Diltiazem 120 qhs   - hold home meds inpatient  - monitor BP  - consider introducing home meds if hypertensive inpatient. Normotensive inpatient.   Home Meds: HCTZ 100qd, Losartan 25qd, Diltiazem 120 qhs   - c/w home meds inpatient   - monitor BP

## 2022-06-02 NOTE — CONSULT NOTE ADULT - SUBJECTIVE AND OBJECTIVE BOX
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 Pt reports while descending stairs, carrying several times, pt tripped falling down ~4 steps, with back striking wall and back striking floor without head trauma or LOC.  Pt reports sudden onset severe lower nonradiating back pain, aching, which worsens with movement and improves with laying still.  Pt reports after experiencing pain, developed lightheadedness and nausea.  Pt denies any fevers, chills, numbness, weakness, paresthesias, headache, neck pain, chest pain, sob, abdominal pain, changes in vision/hearing, use of blood thinners, room spinning dizziness, difficulty voiding, difficulty passing bowel movements, incontinence.  Pt reports she has not been able to walk independently since fall.    RADIOLOGY:   < from: CT Lumbar Spine No Cont (06.02.22 @ 11:08) >  FINDINGS:  VERTEBRAL BODIES AND DISCS:  Focal rounded area of sclerosis T12   vertebral body suggested on the MR 3/2/2016 as well may represent a bone  island. Evidence of an acute fracture involving the superior endplate of   L1. The pedicles are spared. Mild retropulsion of bone into the spinal   canal with about a 20% canal compromise.  ALIGNMENT:  No subluxations.  L1-L2 LEVEL:  Normal.  L2-[id number]:  Normal.  L3-L4 LEVEL:  Normal.  L4-L5 LEVEL:  Left sided facet degenerative change greater than right  L5-S1 LEVEL:  Prominent right facet hypertrophic change  SPINAL CANAL:  No other intradural or extradural defects are seen.  MISCELLANEOUS: None.    IMPRESSION:  Acute L1 superior endplate fracture with sparing of the   pedicles and lateral and posterior elements. Roughly 20% canal compromise   on the basis of retropulsion of superior posterior endplate into the   canal at this level. Focal sclerosis in the T12 vertebral body suggested   on the prior MR 3/2/2016 as well may represent a bone island    PHYSICAL EXAM:  Vital Signs Last 24 Hrs  T(C): 36.7 (02 Jun 2022 09:39), Max: 36.8 (02 Jun 2022 08:36)  T(F): 98 (02 Jun 2022 09:39), Max: 98.2 (02 Jun 2022 08:36)  HR: 60 (02 Jun 2022 09:39) (60 - 64)  BP: 120/61 (02 Jun 2022 09:39) (115/56 - 120/61)  BP(mean): --  RR: 16 (02 Jun 2022 09:39) (15 - 16)  SpO2: 98% (02 Jun 2022 09:39) (98% - 98%)    AOx3, appropriate, follows commands  PERRL, EOMI, face symmetrical   BUE 5/5 strength throughout  RLE 5/5 strength throughout, LLE: DF 1/5, otherwise 5/5 strength throughout  Sensation intact to light touch   No bony midline tenderness to palpation    LABS:                        11.8   7.21  )-----------( 171      ( 02 Jun 2022 10:20 )             37.9     06-02    134<L>  |  103  |  19  ----------------------------<  106<H>  TNP   |  19<L>  |  0.53    Ca    9.0      02 Jun 2022 10:20    TPro  TNP  /  Alb  3.9  /  TBili  0.7  /  DBili  x   /  AST  125<H>  /  ALT  33  /  AlkPhos  52  06-02    PT/INR - ( 02 Jun 2022 10:20 )   PT: 12.8 sec;   INR: 1.10 ratio         PTT - ( 02 Jun 2022 10:20 )  PTT:25.5 sec

## 2022-06-02 NOTE — H&P ADULT - PROBLEM SELECTOR PLAN 1
Patient with likely mechanical fall, rushing to catch flight, no LOC, no head-strike, no radiating pain.   CT w L1 endplate fracture  NSG eval in ED, no acute intervention, rec PT and pain control  - PT consulted, f/u recs  - brace provided  - Pain control with tylenol, toradol. If needed Oxycodone 2.5. Can up-titrate based on pain assessment Patient with likely mechanical fall, rushing to catch flight, no LOC, no head-strike, no radiating pain.   CT w L1 endplate fracture  NSG eval in ED, no acute intervention, rec PT and pain control  - PT consulted, f/u recs  - brace provided  - Pain control with tylenol, toradol. If needed Oxycodone 2.5. Can up-titrate based on pain assessment  - Outpatient follow up in 2 weeks with Dr. White (Neurosurgery)

## 2022-06-02 NOTE — ED PROVIDER NOTE - NS ED ATTENDING STATEMENT MOD
This was a shared visit with the SONALI. I reviewed and verified the documentation and independently performed the documented:

## 2022-06-03 ENCOUNTER — TRANSCRIPTION ENCOUNTER (OUTPATIENT)
Age: 72
End: 2022-06-03

## 2022-06-03 LAB
ALBUMIN SERPL ELPH-MCNC: 3.5 G/DL — SIGNIFICANT CHANGE UP (ref 3.3–5)
ALP SERPL-CCNC: 62 U/L — SIGNIFICANT CHANGE UP (ref 40–120)
ALT FLD-CCNC: 22 U/L — SIGNIFICANT CHANGE UP (ref 4–33)
ANION GAP SERPL CALC-SCNC: 10 MMOL/L — SIGNIFICANT CHANGE UP (ref 7–14)
AST SERPL-CCNC: 35 U/L — HIGH (ref 4–32)
BASOPHILS # BLD AUTO: 0.04 K/UL — SIGNIFICANT CHANGE UP (ref 0–0.2)
BASOPHILS NFR BLD AUTO: 0.7 % — SIGNIFICANT CHANGE UP (ref 0–2)
BILIRUB SERPL-MCNC: 0.8 MG/DL — SIGNIFICANT CHANGE UP (ref 0.2–1.2)
BUN SERPL-MCNC: 16 MG/DL — SIGNIFICANT CHANGE UP (ref 7–23)
CALCIUM SERPL-MCNC: 8.7 MG/DL — SIGNIFICANT CHANGE UP (ref 8.4–10.5)
CHLORIDE SERPL-SCNC: 105 MMOL/L — SIGNIFICANT CHANGE UP (ref 98–107)
CO2 SERPL-SCNC: 26 MMOL/L — SIGNIFICANT CHANGE UP (ref 22–31)
CREAT SERPL-MCNC: 0.55 MG/DL — SIGNIFICANT CHANGE UP (ref 0.5–1.3)
EGFR: 97 ML/MIN/1.73M2 — SIGNIFICANT CHANGE UP
EOSINOPHIL # BLD AUTO: 0.12 K/UL — SIGNIFICANT CHANGE UP (ref 0–0.5)
EOSINOPHIL NFR BLD AUTO: 2.2 % — SIGNIFICANT CHANGE UP (ref 0–6)
GLUCOSE SERPL-MCNC: 94 MG/DL — SIGNIFICANT CHANGE UP (ref 70–99)
HCT VFR BLD CALC: 36.1 % — SIGNIFICANT CHANGE UP (ref 34.5–45)
HGB BLD-MCNC: 11.6 G/DL — SIGNIFICANT CHANGE UP (ref 11.5–15.5)
IANC: 2.31 K/UL — SIGNIFICANT CHANGE UP (ref 1.8–7.4)
IMM GRANULOCYTES NFR BLD AUTO: 0.4 % — SIGNIFICANT CHANGE UP (ref 0–1.5)
LYMPHOCYTES # BLD AUTO: 2.56 K/UL — SIGNIFICANT CHANGE UP (ref 1–3.3)
LYMPHOCYTES # BLD AUTO: 46.8 % — HIGH (ref 13–44)
MAGNESIUM SERPL-MCNC: 1.8 MG/DL — SIGNIFICANT CHANGE UP (ref 1.6–2.6)
MCHC RBC-ENTMCNC: 29.8 PG — SIGNIFICANT CHANGE UP (ref 27–34)
MCHC RBC-ENTMCNC: 32.1 GM/DL — SIGNIFICANT CHANGE UP (ref 32–36)
MCV RBC AUTO: 92.8 FL — SIGNIFICANT CHANGE UP (ref 80–100)
MONOCYTES # BLD AUTO: 0.42 K/UL — SIGNIFICANT CHANGE UP (ref 0–0.9)
MONOCYTES NFR BLD AUTO: 7.7 % — SIGNIFICANT CHANGE UP (ref 2–14)
NEUTROPHILS # BLD AUTO: 2.31 K/UL — SIGNIFICANT CHANGE UP (ref 1.8–7.4)
NEUTROPHILS NFR BLD AUTO: 42.2 % — LOW (ref 43–77)
NRBC # BLD: 0 /100 WBCS — SIGNIFICANT CHANGE UP
NRBC # FLD: 0 K/UL — SIGNIFICANT CHANGE UP
PHOSPHATE SERPL-MCNC: 2.8 MG/DL — SIGNIFICANT CHANGE UP (ref 2.5–4.5)
PLATELET # BLD AUTO: 166 K/UL — SIGNIFICANT CHANGE UP (ref 150–400)
POTASSIUM SERPL-MCNC: 3 MMOL/L — LOW (ref 3.5–5.3)
POTASSIUM SERPL-SCNC: 3 MMOL/L — LOW (ref 3.5–5.3)
PROT SERPL-MCNC: 6.4 G/DL — SIGNIFICANT CHANGE UP (ref 6–8.3)
RBC # BLD: 3.89 M/UL — SIGNIFICANT CHANGE UP (ref 3.8–5.2)
RBC # FLD: 13.1 % — SIGNIFICANT CHANGE UP (ref 10.3–14.5)
SODIUM SERPL-SCNC: 141 MMOL/L — SIGNIFICANT CHANGE UP (ref 135–145)
WBC # BLD: 5.47 K/UL — SIGNIFICANT CHANGE UP (ref 3.8–10.5)
WBC # FLD AUTO: 5.47 K/UL — SIGNIFICANT CHANGE UP (ref 3.8–10.5)

## 2022-06-03 PROCEDURE — 99233 SBSQ HOSP IP/OBS HIGH 50: CPT | Mod: GC

## 2022-06-03 RX ORDER — DILTIAZEM HCL 120 MG
120 CAPSULE, EXT RELEASE 24 HR ORAL
Refills: 0 | Status: DISCONTINUED | OUTPATIENT
Start: 2022-06-03 | End: 2022-06-06

## 2022-06-03 RX ORDER — POTASSIUM CHLORIDE 20 MEQ
40 PACKET (EA) ORAL EVERY 4 HOURS
Refills: 0 | Status: COMPLETED | OUTPATIENT
Start: 2022-06-03 | End: 2022-06-03

## 2022-06-03 RX ADMIN — Medication 650 MILLIGRAM(S): at 06:09

## 2022-06-03 RX ADMIN — Medication 15 MILLIGRAM(S): at 20:20

## 2022-06-03 RX ADMIN — GABAPENTIN 600 MILLIGRAM(S): 400 CAPSULE ORAL at 21:19

## 2022-06-03 RX ADMIN — Medication 40 MILLIEQUIVALENT(S): at 11:07

## 2022-06-03 RX ADMIN — Medication 650 MILLIGRAM(S): at 00:23

## 2022-06-03 RX ADMIN — SERTRALINE 50 MILLIGRAM(S): 25 TABLET, FILM COATED ORAL at 11:10

## 2022-06-03 RX ADMIN — GABAPENTIN 300 MILLIGRAM(S): 400 CAPSULE ORAL at 06:09

## 2022-06-03 RX ADMIN — Medication 15 MILLIGRAM(S): at 11:45

## 2022-06-03 RX ADMIN — Medication 15 MILLIGRAM(S): at 11:13

## 2022-06-03 RX ADMIN — Medication 15 MILLIGRAM(S): at 20:05

## 2022-06-03 RX ADMIN — ENOXAPARIN SODIUM 40 MILLIGRAM(S): 100 INJECTION SUBCUTANEOUS at 11:07

## 2022-06-03 RX ADMIN — Medication 650 MILLIGRAM(S): at 06:51

## 2022-06-03 RX ADMIN — Medication 40 MILLIEQUIVALENT(S): at 14:39

## 2022-06-03 RX ADMIN — Medication 120 MILLIGRAM(S): at 20:04

## 2022-06-03 RX ADMIN — LOSARTAN POTASSIUM 100 MILLIGRAM(S): 100 TABLET, FILM COATED ORAL at 06:06

## 2022-06-03 RX ADMIN — Medication 25 MILLIGRAM(S): at 06:06

## 2022-06-03 NOTE — PROGRESS NOTE ADULT - PROBLEM SELECTOR PLAN 4
Diet: DASH  DVT PPx: Lovenox    Dispo: PT eval pending, pain control Diet: DASH  DVT PPx: Lovenox    Dispo: Patient requesting PT re-eval

## 2022-06-03 NOTE — DISCHARGE NOTE PROVIDER - NSDCFUSCHEDAPPT_GEN_ALL_CORE_FT
Leandro White  [facility]  [facility] [address] [facility]  Scheduled Appointment: 06/22/2022

## 2022-06-03 NOTE — DISCHARGE NOTE PROVIDER - HOSPITAL COURSE
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 Patient was rushing out of her house to catch a flight to [place] where she slipped on her way down the stairs in her apt and fell on her back. She denies head strike, LOC, syncope, lightheadedness, dizziness. The pain is mid lower back, nonradiating, stable when she stays still. Of note patient with chronic lower back pain for which she sees PT outpatient, but that is not related to this back pain. Denies palpitations, CP, HA, SOB. She did endorse some post-fall nausea, but no vomiting.     ED Course: Vital signs unremarkable, within normal limits, stable.   Patient received Tylenol 975 x1, Toradol l15 x1, Flexeril 5 x1.   Evalauted by NSG in the ER, found to have L1 fracture on CT scan, admitted for medical pain management and PT eval as no acute surgical intervention planned.  (02 Jun 2022 17:48)    Hopsital Course:    NSG no acute surgical intervention. Patient given orthotic brace, seen by PT who recommended [id number]. Patient decided for DC home w home PT. pain controlled w tylenol and toradol inpatient, patient refused taking any oxycodone. Now stable for DC needs NSG f/u in 2 weeks and PT.    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 Patient was rushing out of her house to catch a flight to [place] where she slipped on her way down the stairs in her apt and fell on her back. She denies head strike, LOC, syncope, lightheadedness, dizziness. The pain is mid lower back, nonradiating, stable when she stays still. Of note patient with chronic lower back pain for which she sees PT outpatient, but that is not related to this back pain. Denies palpitations, CP, HA, SOB. She did endorse some post-fall nausea, but no vomiting.     ED Course: Vital signs unremarkable, within normal limits, stable.   Patient received Tylenol 975 x1, Toradol l15 x1, Flexeril 5 x1.   Evalauted by NSG in the ER, found to have L1 fracture on CT scan, admitted for medical pain management and PT eval as no acute surgical intervention planned.  (02 Jun 2022 17:48)    Hopsital Course:    NSG no acute surgical intervention. Patient given orthotic brace, seen by PT who recommended home with home PT. Pain controlled w tylenol and toradol inpatient, patient refused taking any oxycodone. Patient hemodynamically stable for DC needs with Neurosurgery f/u in 2 weeks and Home PT.    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 Patient was rushing out of her house to catch a flight to [place] where she slipped on her way down the stairs in her apt and fell on her back. She denies head strike, LOC, syncope, lightheadedness, dizziness. The pain is mid lower back, nonradiating, stable when she stays still. Of note patient with chronic lower back pain for which she sees PT outpatient, but that is not related to this back pain. Denies palpitations, CP, HA, SOB. She did endorse some post-fall nausea, but no vomiting.     ED Course: Vital signs unremarkable, within normal limits, stable.   Patient received Tylenol 975 x1, Toradol l15 x1, Flexeril 5 x1.   Evalauted by NSG in the ER, found to have L1 fracture on CT scan, admitted for medical pain management and PT eval as no acute surgical intervention planned.  (02 Jun 2022 17:48)    Hopsital Course:    NSG no acute surgical intervention. Patient given orthotic brace, seen by PT who recommended home with home PT. Pain controlled w tylenol and toradol inpatient, patient refused taking any oxycodone. Patient hemodynamically stable for DC needs with Neurosurgery f/u in 2 weeks and either rehab or home PT depending on patient's final decision. Otherwise stable for discharge w PCP and NSG f/u.    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 Patient was rushing out of her house to catch a flight to [place] where she slipped on her way down the stairs in her apt and fell on her back. She denies head strike, LOC, syncope, lightheadedness, dizziness. The pain is mid lower back, nonradiating, stable when she stays still. Of note patient with chronic lower back pain for which she sees PT outpatient, but that is not related to this back pain. Denies palpitations, CP, HA, SOB. She did endorse some post-fall nausea, but no vomiting.     ED Course: Vital signs unremarkable, within normal limits, stable.   Patient received Tylenol 975 x1, Toradol l15 x1, Flexeril 5 x1.   Evalauted by NSG in the ER, found to have L1 fracture on CT scan, admitted for medical pain management and PT eval as no acute surgical intervention planned.  (02 Jun 2022 17:48)    Hopsital Course:    NSG no acute surgical intervention. Patient given orthotic brace, seen by PT who recommended home with home PT. Pain controlled w tylenol, lidocaine patch and toradol inpatient, patient refused taking any oxycodone. Patient hemodynamically stable for DC needs with Neurosurgery f/u in 2 weeks and home physical therapy. Otherwise stable for discharge w PCP and NSG f/u.    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 Patient was rushing out of her house to catch a flight to [place] where she slipped on her way down the stairs in her apt and fell on her back. She denies head strike, LOC, syncope, lightheadedness, dizziness. The pain is mid lower back, nonradiating, stable when she stays still. Of note patient with chronic lower back pain for which she sees PT outpatient, but that is not related to this back pain. Denies palpitations, CP, HA, SOB. She did endorse some post-fall nausea, but no vomiting.     ED Course: Vital signs unremarkable, within normal limits, stable.   Patient received Tylenol 975 x1, Toradol l15 x1, Flexeril 5 x1.   Evalauted by NSG in the ER, found to have L1 fracture on CT scan, admitted for medical pain management and PT eval as no acute surgical intervention planned.  (02 Jun 2022 17:48)    Hopsital Course:    NSG no acute surgical intervention. Patient given orthotic brace, seen by PT who recommended home with home PT. Pain controlled w tylenol, lidocaine patch and toradol inpatient, patient refused taking any oxycodone. Patient hemodynamically stable for DC needs with Neurosurgery f/u in 2 weeks and home physical therapy. Otherwise stable for discharge w PCP and NSG f/u.

## 2022-06-03 NOTE — PROGRESS NOTE ADULT - SUBJECTIVE AND OBJECTIVE BOX
Subjective:    INTERVAL HPI/OVERNIGHT EVENTS:   - patient only using tylenol and toradol for pain, no oxycodone.  - pending PT eval   No acute events overnight  Afebrile, hemodynamically stable     Telemetry:    T(F): 98.6 (06-03-22 @ 05:17), Max: 100 (06-02-22 @ 18:40)  HR: 77 (06-03-22 @ 05:17) (60 - 86)  BP: 146/77 (06-03-22 @ 05:17) (115/56 - 169/84)  RR: 17 (06-03-22 @ 05:17) (15 - 18)  SpO2: 96% (06-03-22 @ 05:17) (96% - 100%)  I&O's Summary    Weight (kg): 75.6 (06-02-22 @ 18:40)    Medications:  acetaminophen     Tablet .. 650 milliGRAM(s) Oral every 6 hours PRN  diltiazem    Tablet 120 milliGRAM(s) Oral <User Schedule>  enoxaparin Injectable 40 milliGRAM(s) SubCutaneous every 24 hours  gabapentin 300 milliGRAM(s) Oral daily  gabapentin 600 milliGRAM(s) Oral at bedtime  hydrochlorothiazide 25 milliGRAM(s) Oral daily  influenza  Vaccine (HIGH DOSE) 0.7 milliLiter(s) IntraMuscular once  ketorolac   Injectable 15 milliGRAM(s) IV Push every 6 hours PRN  losartan 100 milliGRAM(s) Oral daily  oxyCODONE    IR 2.5 milliGRAM(s) Oral every 6 hours PRN  senna 2 Tablet(s) Oral at bedtime PRN  sertraline 50 milliGRAM(s) Oral daily      PHYSICAL EXAM:  GENERAL APPEARANCE: Well developed, NAD  HEENT:  PERRL, EOMI. hearing grossly intact.  NECK: Neck supple, non-tender no lymphadenopathy, masses or thyromegaly.  CARDIAC: Normal S1 and S2. no mrg. RRR  LUNGS: Clear to auscultation B/L, no rales, rhonchi, or wheezing  ABDOMEN: Soft , NTND, bowel sounds normal. No guarding or rebound.   MUSCULOSKELETAL: No focal spinal tenderness.   EXTREMITIES: No edema. Peripheral pulses intact.   NEUROLOGICAL: L foot drop. Otherwise, Non focal. Strength and sensation symmetric and intact throughout.   SKIN: Warm and dry , Well perfused  PSYCHIATRIC: AOx4 , Normal mood and affect    Notable Labs:    Labs:                          11.8   7.21  )-----------( 171      ( 02 Jun 2022 10:20 )             37.9     06-02    134<L>  |  103  |  19  ----------------------------<  106<H>  TNP   |  19<L>  |  0.53    Ca    9.0      02 Jun 2022 10:20    TPro  TNP  /  Alb  3.9  /  TBili  0.7  /  DBili  x   /  AST  125<H>  /  ALT  33  /  AlkPhos  52  06-02    LIVER FUNCTIONS - ( 02 Jun 2022 10:20 )  Alb: 3.9 g/dL / Pro: TNP g/dL / ALK PHOS: 52 U/L / ALT: 33 U/L / AST: 125 U/L / GGT: x           PT/INR - ( 02 Jun 2022 10:20 )   PT: 12.8 sec;   INR: 1.10 ratio         PTT - ( 02 Jun 2022 10:20 )  PTT:25.5 sec  CAPILLARY BLOOD GLUCOSE                    Micro:      RADIOLOGY & ADDITIONAL TESTS:    NNI   Subjective:    INTERVAL HPI/OVERNIGHT EVENTS:   - patient only using tylenol and toradol for pain, no oxycodone.  - PT rec home, patient would like re eval tomorrow   - patient felt weak during PT evaluation  No acute events overnight  Afebrile, hemodynamically stable     Telemetry:    T(F): 98.6 (06-03-22 @ 05:17), Max: 100 (06-02-22 @ 18:40)  HR: 77 (06-03-22 @ 05:17) (60 - 86)  BP: 146/77 (06-03-22 @ 05:17) (115/56 - 169/84)  RR: 17 (06-03-22 @ 05:17) (15 - 18)  SpO2: 96% (06-03-22 @ 05:17) (96% - 100%)  I&O's Summary    Weight (kg): 75.6 (06-02-22 @ 18:40)    Medications:  acetaminophen     Tablet .. 650 milliGRAM(s) Oral every 6 hours PRN  diltiazem    Tablet 120 milliGRAM(s) Oral <User Schedule>  enoxaparin Injectable 40 milliGRAM(s) SubCutaneous every 24 hours  gabapentin 300 milliGRAM(s) Oral daily  gabapentin 600 milliGRAM(s) Oral at bedtime  hydrochlorothiazide 25 milliGRAM(s) Oral daily  influenza  Vaccine (HIGH DOSE) 0.7 milliLiter(s) IntraMuscular once  ketorolac   Injectable 15 milliGRAM(s) IV Push every 6 hours PRN  losartan 100 milliGRAM(s) Oral daily  oxyCODONE    IR 2.5 milliGRAM(s) Oral every 6 hours PRN  senna 2 Tablet(s) Oral at bedtime PRN  sertraline 50 milliGRAM(s) Oral daily      PHYSICAL EXAM:  GENERAL APPEARANCE: Well developed, NAD  HEENT:  PERRL, EOMI. hearing grossly intact.  NECK: Neck supple, non-tender no lymphadenopathy, masses or thyromegaly.  CARDIAC: Normal S1 and S2. no mrg. RRR  LUNGS: Clear to auscultation B/L, no rales, rhonchi, or wheezing  ABDOMEN: Soft , NTND, bowel sounds normal. No guarding or rebound.   MUSCULOSKELETAL: No focal spinal tenderness.   EXTREMITIES: No edema. Peripheral pulses intact.   NEUROLOGICAL: L foot drop. Otherwise, Non focal. Strength and sensation symmetric and intact throughout.   SKIN: Warm and dry , Well perfused  PSYCHIATRIC: AOx4 , Normal mood and affect    Notable Labs:    Labs:                          11.8   7.21  )-----------( 171      ( 02 Jun 2022 10:20 )             37.9     06-02    134<L>  |  103  |  19  ----------------------------<  106<H>  TNP   |  19<L>  |  0.53    Ca    9.0      02 Jun 2022 10:20    TPro  TNP  /  Alb  3.9  /  TBili  0.7  /  DBili  x   /  AST  125<H>  /  ALT  33  /  AlkPhos  52  06-02    LIVER FUNCTIONS - ( 02 Jun 2022 10:20 )  Alb: 3.9 g/dL / Pro: TNP g/dL / ALK PHOS: 52 U/L / ALT: 33 U/L / AST: 125 U/L / GGT: x           PT/INR - ( 02 Jun 2022 10:20 )   PT: 12.8 sec;   INR: 1.10 ratio         PTT - ( 02 Jun 2022 10:20 )  PTT:25.5 sec  CAPILLARY BLOOD GLUCOSE      Micro:      RADIOLOGY & ADDITIONAL TESTS:    NNI

## 2022-06-03 NOTE — PHYSICAL THERAPY INITIAL EVALUATION ADULT - ADDITIONAL COMMENTS
Pt lives in a mother daughter house with 3 stairs to enter and 8 stairs to her bedroom on the second floor. Pt stated her daughter and sister live in the same house with a separate entrance. prior to admission pt was independent with all mobility and ambulated without an assistive device. Pt owns a straight cane. Pt stated she could stay with her daughter on the first floor if necessary.     Pt. left comfortable in bed with all tubes/lines intact, call bell in reach and RN at bedside.

## 2022-06-03 NOTE — DISCHARGE NOTE PROVIDER - NSDCCPCAREPLAN_GEN_ALL_CORE_FT
PRINCIPAL DISCHARGE DIAGNOSIS  Diagnosis: Fracture of L1 vertebra  Assessment and Plan of Treatment: You fell and subsequently have a fracture of your L1 spinal vertebral bone. A CT scan was done confirming this. The neurosurgery team did not recommend any surgeries. Your pain was controlled in the hospital with tylenol and toredol (an IV medication similar to motrin). You were provided with a brace, please use it as instructed. You were seen by physical therapy, please continue to work with physical therapy as isntructed.       PRINCIPAL DISCHARGE DIAGNOSIS  Diagnosis: Fracture of L1 vertebra  Assessment and Plan of Treatment: You fell and subsequently have a fracture of your L1 spinal vertebral bone. A CT scan was done confirming this. The neurosurgery team did not recommend any surgeries. Your pain was controlled in the hospital with tylenol and toredol (an IV medication similar to motrin). You were provided with a brace, please use it as instructed. You were seen by physical therapy, please continue to work with physical therapy as isntructed.      SECONDARY DISCHARGE DIAGNOSES  Diagnosis: HTN (hypertension)  Assessment and Plan of Treatment: Your blood pressure was elevated at times in the hospital, even while on your home medications. As we discussed, these high readings may be due to intense pain from your recent spine fracture. Please follow up with your PCP for blood pressure monitoring and to consider any changes to your medications.     PRINCIPAL DISCHARGE DIAGNOSIS  Diagnosis: Fracture of L1 vertebra  Assessment and Plan of Treatment: You fell and subsequently have a fracture of your L1 spinal vertebral bone. A CT scan was done confirming this. The neurosurgery team did not recommend any surgeries. Your pain was controlled in the hospital with tylenol and toredol (an IV medication similar to motrin). You were provided with a brace, please use it as instructed. You were seen by physical therapy, please continue to work with physical therapy as instructed. Please also follow up with our neurosurgery team two weeks after discharge.      SECONDARY DISCHARGE DIAGNOSES  Diagnosis: HTN (hypertension)  Assessment and Plan of Treatment: Your blood pressure was elevated at times in the hospital, even while on your home medications. As we discussed, these high readings may be due to intense pain from your recent spine fracture. Please follow up with your PCP for blood pressure monitoring and to consider any changes to your medications.

## 2022-06-03 NOTE — PHYSICAL THERAPY INITIAL EVALUATION ADULT - GAIT DISTANCE, PT EVAL
During ambulation pt complained of nausea and lightheadedness. Upon return to supine, pt's BP: 88/47. Supine BP after 2 minutes: 113/59, RN, Danna and Catie, made aware and present at bedside at the end of evaluation./50 feet

## 2022-06-03 NOTE — PROGRESS NOTE ADULT - PROBLEM SELECTOR PLAN 1
Patient with likely mechanical fall, rushing to catch flight, no LOC, no head-strike, no radiating pain.   CT w L1 endplate fracture  NSG eval in ED, no acute intervention, rec PT and pain control  - PT consulted, f/u recs  - brace provided  - Pain control with tylenol, toradol. If needed Oxycodone 2.5. Can up-titrate based on pain assessment; patient not using the oxy per personal preference.   - Outpatient follow up in 2 weeks with Dr. White (Neurosurgery) Patient with likely mechanical fall, rushing to catch flight, no LOC, no head-strike, no radiating pain.   CT w L1 endplate fracture  NSG eval in ED, no acute intervention, rec PT and pain control  - PT consulted, rec home PT, patient would like re-evaluation over weekend and is now considering rehab if recommended again   - brace provided  - Pain control with tylenol, toradol. If needed Oxycodone 2.5. Can up-titrate based on pain assessment; patient not using the oxy per personal preference.   - Outpatient follow up in 2 weeks with Dr. White (Neurosurgery)

## 2022-06-03 NOTE — DISCHARGE NOTE PROVIDER - NSDCCPTREATMENT_GEN_ALL_CORE_FT
PRINCIPAL PROCEDURE  Procedure: CT lumbar spine  Findings and Treatment: FINDINGS:  VERTEBRAL BODIES AND DISCS:  Focal rounded area of sclerosis T12   vertebral body suggested on the MR 3/2/2016 as well may represent a bone  island. Evidence of an acute fracture involving the superior endplate of   L1. The pedicles are spared. Mild retropulsion of bone into the spinal   canal with about a 20% canal compromise.  ALIGNMENT:  No subluxations.  L1-L2 LEVEL:  Normal.  L2-S7TSFYH:  Normal.  L3-L4 LEVEL:  Normal.  L4-L5 LEVEL:  Left sided facet degenerative change greater than right  L5-S1 LEVEL:  Prominent right facet hypertrophic change  SPINAL CANAL:  No other intradural or extradural defects are seen.  MISCELLANEOUS: None.  IMPRESSION:  Acute L1 superior endplate fracture with sparing of the   pedicles and lateral and posterior elements. Roughly 20% canal compromise   on the basis of retropulsion of superior posterior endplate into the   canal at this level. Focal sclerosis in the T12 vertebral body suggested   on the prior MR 3/2/2016 as well may represent a bone island

## 2022-06-03 NOTE — PHYSICAL THERAPY INITIAL EVALUATION ADULT - PATIENT PROFILE REVIEW, REHAB EVAL
yes PT orders received, chart reviewed. ACTIVITY: ambulate as tolerated RN, Danna, endorsed pt to PT initial evaluation. Pt willing and able to participate in therapy session./yes

## 2022-06-03 NOTE — DISCHARGE NOTE PROVIDER - PROVIDER TOKENS
PROVIDER:[TOKEN:[3250:MIIS:3250],FOLLOWUP:[2 weeks]],FREE:[LAST:[Rosy],FIRST:[miky],PHONE:[(   )    -],FAX:[(   )    -]]

## 2022-06-03 NOTE — PHYSICAL THERAPY INITIAL EVALUATION ADULT - MANUAL MUSCLE TESTING RESULTS, REHAB EVAL
spinal precautions, Bilateral UE muscle strength grossly 3/5 Throughout; Bilateral LE muscle strength grossly 3/5 Throughout, except for left foot dorsiflexion 2+/5/grossly assessed due to

## 2022-06-03 NOTE — PHYSICAL THERAPY INITIAL EVALUATION ADULT - DISCHARGE DISPOSITION, PT EVAL
anticipated discharge to home with home PT services and supervision once pt is medically clear, to address current functional limitations to optimize safety within the home environment with the use of a rolling walker./home w/ home PT

## 2022-06-03 NOTE — DISCHARGE NOTE NURSING/CASE MANAGEMENT/SOCIAL WORK - NSDCPEFALRISK_GEN_ALL_CORE
For information on Fall & Injury Prevention, visit: [email or web address] OR  [email or web address] OR  https://www.cdc.gov/steadi/patient.html

## 2022-06-03 NOTE — PROVIDER CONTACT NOTE (OTHER) - SITUATION
Patient refusing , Lovenox. diltiazem, and requesting gabapentin. patient states she has been taking diltiazem at night and gabapentin with morning losartan

## 2022-06-03 NOTE — DISCHARGE NOTE PROVIDER - NSDCMRMEDTOKEN_GEN_ALL_CORE_FT
dilTIAZem 120 mg oral tablet: 1 tab(s) orally once a day  gabapentin 300 mg oral capsule: 1 cap(s) orally once a day  gabapentin 300 mg oral capsule: 2 cap(s) orally once a day (at bedtime)  losartan-hydrochlorothiazide 100 mg-25 mg oral tablet: 1 tab(s) orally once a day  sertraline: 50 milligram(s) orally once a day   acetaminophen 325 mg oral tablet: 2 tab(s) orally every 6 hours, As needed, Temp greater or equal to 38C (100.4F), Mild Pain (1 - 3)  dilTIAZem 120 mg oral tablet: 1 tab(s) orally once a day  gabapentin 300 mg oral capsule: 1 cap(s) orally once a day  gabapentin 300 mg oral capsule: 2 cap(s) orally once a day (at bedtime)  ibuprofen 200 mg oral tablet: 2 tab(s) orally every 6 hours   losartan-hydrochlorothiazide 100 mg-25 mg oral tablet: 1 tab(s) orally once a day  sertraline: 50 milligram(s) orally once a day   acetaminophen 325 mg oral tablet: 2 tab(s) orally every 6 hours, As needed, Temp greater or equal to 38C (100.4F), Mild Pain (1 - 3)  dilTIAZem 120 mg oral tablet: 1 tab(s) orally once a day  gabapentin 300 mg oral capsule: 1 cap(s) orally once a day  gabapentin 300 mg oral capsule: 2 cap(s) orally once a day (at bedtime)  ibuprofen 200 mg oral tablet: 2 tab(s) orally every 6 hours   lidocaine 4% topical film: Apply topically to affected area once a day   losartan-hydrochlorothiazide 100 mg-25 mg oral tablet: 1 tab(s) orally once a day  sertraline: 50 milligram(s) orally once a day   acetaminophen 325 mg oral tablet: 2 tab(s) orally every 6 hours, As needed, Temp greater or equal to 38C (100.4F), Mild Pain (1 - 3)  dilTIAZem 120 mg oral tablet: 1 tab(s) orally once a day  gabapentin 300 mg oral capsule: 1 cap(s) orally once a day  gabapentin 300 mg oral capsule: 2 cap(s) orally once a day (at bedtime)  ibuprofen 200 mg oral tablet: 2 tab(s) orally every 6 hours   lidocaine 4% topical film: Apply topically to affected area once a day   losartan-hydrochlorothiazide 100 mg-25 mg oral tablet: 1 tab(s) orally once a day  oxyCODONE 5 mg oral capsule: 0.5 cap(s) orally every 6 hours MDD:2 tablets, 10mg  sertraline: 50 milligram(s) orally once a day   acetaminophen 325 mg oral tablet: 2 tab(s) orally every 6 hours, As needed, Temp greater or equal to 38C (100.4F), Mild Pain (1 - 3) for pain  dilTIAZem 120 mg oral tablet: 1 tab(s) orally once a day  gabapentin 300 mg oral capsule: 1 cap(s) orally once a day  gabapentin 300 mg oral capsule: 2 cap(s) orally once a day (at bedtime)  ibuprofen 200 mg oral tablet: 2 tab(s) orally every 6 hours, As Needed for pain  lidocaine 4% topical film: Apply topically to affected area once a day   losartan-hydrochlorothiazide 100 mg-25 mg oral tablet: 1 tab(s) orally once a day  Oxaydo 5 mg oral tablet: 0.5 tab(s) orally every 6 hours, As Needed for pain MDD:10mg  sertraline: 50 milligram(s) orally once a day

## 2022-06-03 NOTE — DISCHARGE NOTE NURSING/CASE MANAGEMENT/SOCIAL WORK - PATIENT PORTAL LINK FT
You can access the FollowMyHealth Patient Portal offered by [facility] by registering at the following website: [email or web address]. By joining [facility]’s FollowMyHealth portal, you will also be able to view your health information using other applications (apps) compatible with our system.

## 2022-06-03 NOTE — DISCHARGE NOTE PROVIDER - CARE PROVIDER_API CALL
Leandro White (MD)  Neurosurgery  [address], [address]  [place] [address]  Phone: [phone]  Fax: [phone]  Follow Up Time: 2 weeks    miky Gallegos  Phone: (   )    -  Fax: (   )    -  Follow Up Time:

## 2022-06-03 NOTE — DISCHARGE NOTE NURSING/CASE MANAGEMENT/SOCIAL WORK - NSSCTYPOFSERV_GEN_ALL_CORE
RN/PT services. Nurse to visit on day after discharge. Nurse to call prior to visit to arrange. Physical therapy to follow.

## 2022-06-03 NOTE — PHYSICAL THERAPY INITIAL EVALUATION ADULT - PLANNED THERAPY INTERVENTIONS, PT EVAL
stair training/balance training/bed mobility training/neuromuscular re-education/strengthening/transfer training

## 2022-06-04 LAB
ANION GAP SERPL CALC-SCNC: 12 MMOL/L — SIGNIFICANT CHANGE UP (ref 7–14)
BUN SERPL-MCNC: 14 MG/DL — SIGNIFICANT CHANGE UP (ref 7–23)
CALCIUM SERPL-MCNC: 9.2 MG/DL — SIGNIFICANT CHANGE UP (ref 8.4–10.5)
CHLORIDE SERPL-SCNC: 103 MMOL/L — SIGNIFICANT CHANGE UP (ref 98–107)
CO2 SERPL-SCNC: 23 MMOL/L — SIGNIFICANT CHANGE UP (ref 22–31)
CREAT SERPL-MCNC: 0.56 MG/DL — SIGNIFICANT CHANGE UP (ref 0.5–1.3)
EGFR: 97 ML/MIN/1.73M2 — SIGNIFICANT CHANGE UP
GLUCOSE SERPL-MCNC: 96 MG/DL — SIGNIFICANT CHANGE UP (ref 70–99)
MAGNESIUM SERPL-MCNC: 1.7 MG/DL — SIGNIFICANT CHANGE UP (ref 1.6–2.6)
PHOSPHATE SERPL-MCNC: 3.4 MG/DL — SIGNIFICANT CHANGE UP (ref 2.5–4.5)
POTASSIUM SERPL-MCNC: 3.5 MMOL/L — SIGNIFICANT CHANGE UP (ref 3.5–5.3)
POTASSIUM SERPL-SCNC: 3.5 MMOL/L — SIGNIFICANT CHANGE UP (ref 3.5–5.3)
SODIUM SERPL-SCNC: 138 MMOL/L — SIGNIFICANT CHANGE UP (ref 135–145)

## 2022-06-04 PROCEDURE — 99232 SBSQ HOSP IP/OBS MODERATE 35: CPT

## 2022-06-04 RX ORDER — LANOLIN ALCOHOL/MO/W.PET/CERES
3 CREAM (GRAM) TOPICAL AT BEDTIME
Refills: 0 | Status: DISCONTINUED | OUTPATIENT
Start: 2022-06-04 | End: 2022-06-06

## 2022-06-04 RX ORDER — ACETAMINOPHEN 500 MG
2 TABLET ORAL
Qty: 240 | Refills: 0
Start: 2022-06-04 | End: 2022-07-03

## 2022-06-04 RX ORDER — IBUPROFEN 200 MG
2 TABLET ORAL
Qty: 240 | Refills: 0
Start: 2022-06-04 | End: 2022-07-03

## 2022-06-04 RX ADMIN — Medication 15 MILLIGRAM(S): at 18:11

## 2022-06-04 RX ADMIN — GABAPENTIN 300 MILLIGRAM(S): 400 CAPSULE ORAL at 12:59

## 2022-06-04 RX ADMIN — Medication 15 MILLIGRAM(S): at 17:56

## 2022-06-04 RX ADMIN — Medication 650 MILLIGRAM(S): at 06:10

## 2022-06-04 RX ADMIN — Medication 3 MILLIGRAM(S): at 23:07

## 2022-06-04 RX ADMIN — GABAPENTIN 600 MILLIGRAM(S): 400 CAPSULE ORAL at 21:03

## 2022-06-04 RX ADMIN — Medication 15 MILLIGRAM(S): at 10:32

## 2022-06-04 RX ADMIN — LOSARTAN POTASSIUM 100 MILLIGRAM(S): 100 TABLET, FILM COATED ORAL at 05:16

## 2022-06-04 RX ADMIN — Medication 120 MILLIGRAM(S): at 20:25

## 2022-06-04 RX ADMIN — Medication 650 MILLIGRAM(S): at 05:16

## 2022-06-04 RX ADMIN — SERTRALINE 50 MILLIGRAM(S): 25 TABLET, FILM COATED ORAL at 13:00

## 2022-06-04 RX ADMIN — ENOXAPARIN SODIUM 40 MILLIGRAM(S): 100 INJECTION SUBCUTANEOUS at 12:59

## 2022-06-04 RX ADMIN — Medication 15 MILLIGRAM(S): at 10:17

## 2022-06-04 RX ADMIN — Medication 25 MILLIGRAM(S): at 05:16

## 2022-06-04 NOTE — PROGRESS NOTE ADULT - PROBLEM SELECTOR PLAN 1
Patient with likely mechanical fall, rushing to catch flight, no LOC, no head-strike, no radiating pain.   CT w L1 endplate fracture  NSG eval in ED, no acute intervention, rec PT and pain control  - PT consulted, rec home PT, patient would like re-evaluation over weekend and is now considering rehab if recommended again   - brace provided  - Pain control with tylenol, toradol. If needed Oxycodone 2.5. Can up-titrate based on pain assessment; patient not using the oxy per personal preference.   - Outpatient follow up in 2 weeks with Dr. White (Neurosurgery)

## 2022-06-04 NOTE — PROGRESS NOTE ADULT - PROBLEM SELECTOR PLAN 4
Diet: DASH  DVT PPx: Lovenox    Dispo: Patient requesting PT re-eval, possibly 6/4, patient undecided on PT DC goal.

## 2022-06-04 NOTE — PROGRESS NOTE ADULT - SUBJECTIVE AND OBJECTIVE BOX
Subjective:    INTERVAL HPI/OVERNIGHT EVENTS:   - patient requested to stay in hospital for pain control as well as PT re-evaluation.  - Possible DC today depending on PT recs and patient preference for PT.   No acute events overnight  Afebrile, hemodynamically stable     Telemetry:    T(F): 98.9 (06-04-22 @ 05:13), Max: 99 (06-03-22 @ 20:56)  HR: 68 (06-04-22 @ 06:39) (68 - 76)  BP: 165/68 (06-04-22 @ 06:39) (148/85 - 180/92)  RR: 17 (06-04-22 @ 06:39) (17 - 18)  SpO2: 96% (06-04-22 @ 06:39) (96% - 100%)  I&O's Summary    Weight (kg): 75.6 (06-02-22 @ 18:40)    Medications:  acetaminophen     Tablet .. 650 milliGRAM(s) Oral every 6 hours PRN  diltiazem    Tablet 120 milliGRAM(s) Oral <User Schedule>  enoxaparin Injectable 40 milliGRAM(s) SubCutaneous every 24 hours  gabapentin 300 milliGRAM(s) Oral daily  gabapentin 600 milliGRAM(s) Oral at bedtime  hydrochlorothiazide 25 milliGRAM(s) Oral daily  influenza  Vaccine (HIGH DOSE) 0.7 milliLiter(s) IntraMuscular once  ketorolac   Injectable 15 milliGRAM(s) IV Push every 6 hours PRN  losartan 100 milliGRAM(s) Oral daily  oxyCODONE    IR 2.5 milliGRAM(s) Oral every 6 hours PRN  senna 2 Tablet(s) Oral at bedtime PRN  sertraline 50 milliGRAM(s) Oral daily      PHYSICAL EXAM:  GENERAL APPEARANCE: Well developed, NAD  HEENT:  PERRL, EOMI. hearing grossly intact.  NECK: Neck supple, non-tender no lymphadenopathy, masses or thyromegaly.  CARDIAC: Normal S1 and S2. no mrg. RRR  LUNGS: Clear to auscultation B/L, no rales, rhonchi, or wheezing  ABDOMEN: Soft , NTND, bowel sounds normal. No guarding or rebound.   MUSCULOSKELETAL: No focal spinal tenderness.   EXTREMITIES: No edema. Peripheral pulses intact.   NEUROLOGICAL: L foot drop. Otherwise, Non focal. Strength and sensation symmetric and intact throughout.   SKIN: Warm and dry , Well perfused  PSYCHIATRIC: AOx4 , Normal mood and affect    Notable Labs:    Labs:                          11.6   5.47  )-----------( 166      ( 03 Jun 2022 06:25 )             36.1     06-04    138  |  103  |  14  ----------------------------<  96  3.5   |  23  |  0.56    Ca    9.2      04 Jun 2022 06:20  Phos  3.4     06-04  Mg     1.70     06-04    TPro  6.4  /  Alb  3.5  /  TBili  0.8  /  DBili  x   /  AST  35<H>  /  ALT  22  /  AlkPhos  62  06-03    LIVER FUNCTIONS - ( 03 Jun 2022 06:25 )  Alb: 3.5 g/dL / Pro: 6.4 g/dL / ALK PHOS: 62 U/L / ALT: 22 U/L / AST: 35 U/L / GGT: x           PT/INR - ( 02 Jun 2022 10:20 )   PT: 12.8 sec;   INR: 1.10 ratio         PTT - ( 02 Jun 2022 10:20 )  PTT:25.5 sec  CAPILLARY BLOOD GLUCOSE                    Micro:      RADIOLOGY & ADDITIONAL TESTS:    EKG:      Echo:      Xray:      US/CT/MRI:   Subjective:    INTERVAL HPI/OVERNIGHT EVENTS:   - patient requested to stay in hospital for pain control as well as PT re-evaluation.  - Possible DC today depending on PT recs and patient preference for PT.   - patient leaning towards rehab if offered  - patient still staying away from small dose oxycodone. Trying tylenol and toradol   - otherwise well this AM. Denies fevers, chills. Still w back pain especially w movement.   No acute events overnight  Afebrile, hemodynamically stable     Telemetry:    T(F): 98.9 (06-04-22 @ 05:13), Max: 99 (06-03-22 @ 20:56)  HR: 68 (06-04-22 @ 06:39) (68 - 76)  BP: 165/68 (06-04-22 @ 06:39) (148/85 - 180/92)  RR: 17 (06-04-22 @ 06:39) (17 - 18)  SpO2: 96% (06-04-22 @ 06:39) (96% - 100%)  I&O's Summary    Weight (kg): 75.6 (06-02-22 @ 18:40)    Medications:  acetaminophen     Tablet .. 650 milliGRAM(s) Oral every 6 hours PRN  diltiazem    Tablet 120 milliGRAM(s) Oral <User Schedule>  enoxaparin Injectable 40 milliGRAM(s) SubCutaneous every 24 hours  gabapentin 300 milliGRAM(s) Oral daily  gabapentin 600 milliGRAM(s) Oral at bedtime  hydrochlorothiazide 25 milliGRAM(s) Oral daily  influenza  Vaccine (HIGH DOSE) 0.7 milliLiter(s) IntraMuscular once  ketorolac   Injectable 15 milliGRAM(s) IV Push every 6 hours PRN  losartan 100 milliGRAM(s) Oral daily  oxyCODONE    IR 2.5 milliGRAM(s) Oral every 6 hours PRN  senna 2 Tablet(s) Oral at bedtime PRN  sertraline 50 milliGRAM(s) Oral daily      PHYSICAL EXAM:  GENERAL APPEARANCE: Well developed, NAD  HEENT:  PERRL, EOMI. hearing grossly intact.  NECK: Neck supple, non-tender no lymphadenopathy, masses or thyromegaly.  CARDIAC: Normal S1 and S2. no mrg. RRR  LUNGS: Clear to auscultation B/L, no rales, rhonchi, or wheezing  ABDOMEN: Soft , NTND, bowel sounds normal. No guarding or rebound.   MUSCULOSKELETAL: No focal spinal tenderness.   EXTREMITIES: No edema. Peripheral pulses intact.   NEUROLOGICAL: L foot drop. Otherwise, Non focal. Strength and sensation symmetric and intact throughout.   SKIN: Warm and dry , Well perfused  PSYCHIATRIC: AOx4 , Normal mood and affect    Notable Labs:    Labs:                          11.6   5.47  )-----------( 166      ( 03 Jun 2022 06:25 )             36.1     06-04    138  |  103  |  14  ----------------------------<  96  3.5   |  23  |  0.56    Ca    9.2      04 Jun 2022 06:20  Phos  3.4     06-04  Mg     1.70     06-04    TPro  6.4  /  Alb  3.5  /  TBili  0.8  /  DBili  x   /  AST  35<H>  /  ALT  22  /  AlkPhos  62  06-03    LIVER FUNCTIONS - ( 03 Jun 2022 06:25 )  Alb: 3.5 g/dL / Pro: 6.4 g/dL / ALK PHOS: 62 U/L / ALT: 22 U/L / AST: 35 U/L / GGT: x           PT/INR - ( 02 Jun 2022 10:20 )   PT: 12.8 sec;   INR: 1.10 ratio         PTT - ( 02 Jun 2022 10:20 )  PTT:25.5 sec  CAPILLARY BLOOD GLUCOSE                    Micro:      RADIOLOGY & ADDITIONAL TESTS:    NNI

## 2022-06-05 PROCEDURE — 99232 SBSQ HOSP IP/OBS MODERATE 35: CPT | Mod: GC

## 2022-06-05 RX ORDER — KETOROLAC TROMETHAMINE 30 MG/ML
10 SYRINGE (ML) INJECTION EVERY 6 HOURS
Refills: 0 | Status: DISCONTINUED | OUTPATIENT
Start: 2022-06-05 | End: 2022-06-06

## 2022-06-05 RX ORDER — LIDOCAINE 4 G/100G
1 CREAM TOPICAL EVERY 24 HOURS
Refills: 0 | Status: DISCONTINUED | OUTPATIENT
Start: 2022-06-05 | End: 2022-06-06

## 2022-06-05 RX ORDER — LIDOCAINE 4 G/100G
1 CREAM TOPICAL
Qty: 14 | Refills: 0
Start: 2022-06-05 | End: 2022-06-18

## 2022-06-05 RX ADMIN — LOSARTAN POTASSIUM 100 MILLIGRAM(S): 100 TABLET, FILM COATED ORAL at 05:50

## 2022-06-05 RX ADMIN — Medication 650 MILLIGRAM(S): at 16:20

## 2022-06-05 RX ADMIN — LIDOCAINE 1 PATCH: 4 CREAM TOPICAL at 21:45

## 2022-06-05 RX ADMIN — SERTRALINE 50 MILLIGRAM(S): 25 TABLET, FILM COATED ORAL at 11:13

## 2022-06-05 RX ADMIN — GABAPENTIN 300 MILLIGRAM(S): 400 CAPSULE ORAL at 11:13

## 2022-06-05 RX ADMIN — Medication 650 MILLIGRAM(S): at 06:45

## 2022-06-05 RX ADMIN — Medication 650 MILLIGRAM(S): at 17:20

## 2022-06-05 RX ADMIN — ENOXAPARIN SODIUM 40 MILLIGRAM(S): 100 INJECTION SUBCUTANEOUS at 11:12

## 2022-06-05 RX ADMIN — LIDOCAINE 1 PATCH: 4 CREAM TOPICAL at 09:26

## 2022-06-05 RX ADMIN — Medication 25 MILLIGRAM(S): at 05:50

## 2022-06-05 RX ADMIN — Medication 650 MILLIGRAM(S): at 05:51

## 2022-06-05 RX ADMIN — Medication 120 MILLIGRAM(S): at 20:30

## 2022-06-05 RX ADMIN — LIDOCAINE 1 PATCH: 4 CREAM TOPICAL at 19:03

## 2022-06-05 RX ADMIN — GABAPENTIN 600 MILLIGRAM(S): 400 CAPSULE ORAL at 22:35

## 2022-06-05 NOTE — PROVIDER CONTACT NOTE (OTHER) - DATE AND TIME:
03-Jun-2022 06:20
04-Jun-2022 13:02
04-Jun-2022 06:40
04-Jun-2022 23:17
03-Jun-2022 23:01
03-Jun-2022 20:26
04-Jun-2022 05:35
05-Jun-2022 06:38
02-Jun-2022 18:35

## 2022-06-05 NOTE — PROGRESS NOTE ADULT - PROBLEM SELECTOR PLAN 1
Patient with likely mechanical fall, rushing to catch flight, no LOC, no head-strike, no radiating pain.   CT w L1 endplate fracture  NSG eval in ED, no acute intervention, rec PT and pain control  - PT consulted, rec home PT, patient would like re-evaluation over weekend and is now considering rehab if recommended again   - brace provided  - Pain control with tylenol, toradol. If needed Oxycodone 2.5. Can up-titrate based on pain assessment; patient not using the oxy per personal preference.   - add lido patch  - Outpatient follow up in 2 weeks with Dr. White (Neurosurgery)

## 2022-06-05 NOTE — PROVIDER CONTACT NOTE (OTHER) - ASSESSMENT
pt c/o of back pain
pt is alert and oriented. denies any discomfort.
Patient is asymptomatic
No signs of acute distress. Patient refusing , Lovenox. diltiazem, and requesting gabapentin. patient states she has been taking diltiazem at night and gabapentin with morning losartan
pt is alert and oriented. denies any discomfort.
pt is alert and oriented. denies any discomfort.
pt is alert and oriented. has c/o pain

## 2022-06-05 NOTE — PROGRESS NOTE ADULT - ATTENDING COMMENTS
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 No LOC, head trauma, palpitations, dizziness, or seizure like activity. Full memory of event. Here found to have acute L1 fx. Evaluated by NSG, not advising surgical intervention. PT recommending outpatient PT.     Hypertensive this AM and last night. Suspect driven by pain. Patient is hesitant to try oxycodone. Will continue to trend BP prior to adjusting anti-hypertensives.
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 No LOC, head trauma, palpitations, dizziness, or seizure like activity. Full memory of event. Here found to have acute L1 fx. Evaluated by NSG, not advising surgical intervention. PT recommending outpatient PT. Plan for discharge home pending adequate pain control.    Hypertensive this AM and last night. Suspect driven by pain. Patient is hesitant to try oxycodone. Will continue to trend BP prior to adjusting anti-hypertensives.
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 No LOC, head trauma, palpitations, dizziness, or seizure like activity. Full memory of event. Here found to have acute L1 fx. Evaluated by NSG, not advising surgical intervention. PT recommending outpatient PT. Plan for discharge home pending adequate pain control.

## 2022-06-05 NOTE — PROVIDER CONTACT NOTE (OTHER) - ACTION/TREATMENT ORDERED:
no recommendation at this time. will continue monitoring.
pt received HCTZ and losartan po. will continue monitoring.
pt received diltiazem po. will continue monitoring.
MD made aware. No new interventions at this time.
no recommendation at this time. will continue monitoring.
MD notified. Diltiazem to be given at night per MD orders. Gabapentin to be given with morning Losartan  per MD orders. Medications administered as prescribed. Patient continues to refuse Lovenox
no further intervention
pt received hydrochlorothiazide and losartan po this am. will continue monitoring.
given prn Toradol 15 mg iv push. also she received Cardizem 120 mg po. will continue monitoring.

## 2022-06-05 NOTE — PROGRESS NOTE ADULT - SUBJECTIVE AND OBJECTIVE BOX
PROGRESS NOTE:     Patient is a 72y old  Female who presents with a chief complaint of Fall/ Spinal Fracture (04 Jun 2022 07:18)      SUBJECTIVE / OVERNIGHT EVENTS:  No acute events overnight. BP above goal. C/o pressure like pain in lower back.    ADDITIONAL REVIEW OF SYSTEMS:    MEDICATIONS  (STANDING):  diltiazem    Tablet 120 milliGRAM(s) Oral <User Schedule>  enoxaparin Injectable 40 milliGRAM(s) SubCutaneous every 24 hours  gabapentin 300 milliGRAM(s) Oral daily  gabapentin 600 milliGRAM(s) Oral at bedtime  hydrochlorothiazide 25 milliGRAM(s) Oral daily  influenza  Vaccine (HIGH DOSE) 0.7 milliLiter(s) IntraMuscular once  lidocaine   4% Patch 1 Patch Transdermal every 24 hours  losartan 100 milliGRAM(s) Oral daily  sertraline 50 milliGRAM(s) Oral daily    MEDICATIONS  (PRN):  acetaminophen     Tablet .. 650 milliGRAM(s) Oral every 6 hours PRN Temp greater or equal to 38C (100.4F), Mild Pain (1 - 3)  ketorolac   Injectable 15 milliGRAM(s) IV Push every 6 hours PRN Moderate Pain (4 - 6)  melatonin 3 milliGRAM(s) Oral at bedtime PRN Insomnia  oxyCODONE    IR 2.5 milliGRAM(s) Oral every 6 hours PRN Severe Pain (7 - 10)  senna 2 Tablet(s) Oral at bedtime PRN Constipation      CAPILLARY BLOOD GLUCOSE        I&O's Summary      PHYSICAL EXAM:  Vital Signs Last 24 Hrs  T(C): 36.9 (05 Jun 2022 05:47), Max: 36.9 (05 Jun 2022 05:47)  T(F): 98.5 (05 Jun 2022 05:47), Max: 98.5 (05 Jun 2022 05:47)  HR: 72 (05 Jun 2022 05:47) (65 - 88)  BP: 165/71 (05 Jun 2022 05:47) (159/69 - 171/71)  BP(mean): --  RR: 17 (05 Jun 2022 05:47) (17 - 17)  SpO2: 99% (05 Jun 2022 05:47) (95% - 99%)    GENERAL: NAD, lying in bed comfortably  HEAD:  Atraumatic, Normocephalic  EYES: EOMI, PERRLA, conjunctiva and sclera clear  ENT: Moist mucous membranes  NECK: Supple, No JVD  CHEST/LUNG: Clear to auscultation bilaterally; No rales, rhonchi, wheezing, or rubs. Unlabored respirations  HEART: Regular rate and rhythm; No murmurs, rubs, or gallops  ABDOMEN: BSx4; Soft, nontender, nondistended  EXTREMITIES:  2+ Peripheral Pulses, brisk capillary refill. No clubbing, cyanosis, or edema  NERVOUS SYSTEM:  A&Ox3, no focal deficits   SKIN: No rashes or lesions    LABS:    06-04    138  |  103  |  14  ----------------------------<  96  3.5   |  23  |  0.56    Ca    9.2      04 Jun 2022 06:20  Phos  3.4     06-04  Mg     1.70     06-04                  RADIOLOGY & ADDITIONAL TESTS:  Results Reviewed:   Imaging Personally Reviewed:  Electrocardiogram Personally Reviewed:    COORDINATION OF CARE:  Care Discussed with Consultants/Other Providers [Y/N]:  Prior or Outpatient Records Reviewed [Y/N]:

## 2022-06-05 NOTE — PROVIDER CONTACT NOTE (OTHER) - REASON
elevated BP
elevated BP
Patient refusing medications
elevated BP
elevated BP
pt hypertensive
elevated BP
elevated BP
/75

## 2022-06-05 NOTE — PROGRESS NOTE ADULT - PROBLEM SELECTOR PLAN 4
Diet: DASH  DVT PPx: Lovenox    Dispo: Patient requesting PT re-eval, possibly 6/4, patient undecided on PT DC goal. Diet: DASH  DVT PPx: Lovenox    Dispo: Home w/PT. Daughter will be available to accept patient on Monday.

## 2022-06-05 NOTE — PROVIDER CONTACT NOTE (OTHER) - BACKGROUND
pt is admitted with back pain
Patient admitted with unspecified fracture of first lumbar vertebra
pt admitted for fall,
pt is admitted with back pain
patient admitted with L1 fracture
pt is admitted with back pain

## 2022-06-06 VITALS
OXYGEN SATURATION: 95 % | RESPIRATION RATE: 18 BRPM | DIASTOLIC BLOOD PRESSURE: 71 MMHG | HEART RATE: 74 BPM | TEMPERATURE: 99 F | SYSTOLIC BLOOD PRESSURE: 148 MMHG

## 2022-06-06 LAB
ANION GAP SERPL CALC-SCNC: 11 MMOL/L — SIGNIFICANT CHANGE UP (ref 7–14)
BUN SERPL-MCNC: 17 MG/DL — SIGNIFICANT CHANGE UP (ref 7–23)
CALCIUM SERPL-MCNC: 9.6 MG/DL — SIGNIFICANT CHANGE UP (ref 8.4–10.5)
CHLORIDE SERPL-SCNC: 101 MMOL/L — SIGNIFICANT CHANGE UP (ref 98–107)
CO2 SERPL-SCNC: 27 MMOL/L — SIGNIFICANT CHANGE UP (ref 22–31)
CREAT SERPL-MCNC: 0.6 MG/DL — SIGNIFICANT CHANGE UP (ref 0.5–1.3)
EGFR: 95 ML/MIN/1.73M2 — SIGNIFICANT CHANGE UP
GLUCOSE SERPL-MCNC: 97 MG/DL — SIGNIFICANT CHANGE UP (ref 70–99)
HCT VFR BLD CALC: 40 % — SIGNIFICANT CHANGE UP (ref 34.5–45)
HGB BLD-MCNC: 13.2 G/DL — SIGNIFICANT CHANGE UP (ref 11.5–15.5)
MAGNESIUM SERPL-MCNC: 1.6 MG/DL — SIGNIFICANT CHANGE UP (ref 1.6–2.6)
MCHC RBC-ENTMCNC: 29.7 PG — SIGNIFICANT CHANGE UP (ref 27–34)
MCHC RBC-ENTMCNC: 33 GM/DL — SIGNIFICANT CHANGE UP (ref 32–36)
MCV RBC AUTO: 90.1 FL — SIGNIFICANT CHANGE UP (ref 80–100)
NRBC # BLD: 0 /100 WBCS — SIGNIFICANT CHANGE UP
NRBC # FLD: 0 K/UL — SIGNIFICANT CHANGE UP
PHOSPHATE SERPL-MCNC: 4.8 MG/DL — HIGH (ref 2.5–4.5)
PLATELET # BLD AUTO: 180 K/UL — SIGNIFICANT CHANGE UP (ref 150–400)
POTASSIUM SERPL-MCNC: 3.6 MMOL/L — SIGNIFICANT CHANGE UP (ref 3.5–5.3)
POTASSIUM SERPL-SCNC: 3.6 MMOL/L — SIGNIFICANT CHANGE UP (ref 3.5–5.3)
RBC # BLD: 4.44 M/UL — SIGNIFICANT CHANGE UP (ref 3.8–5.2)
RBC # FLD: 12.9 % — SIGNIFICANT CHANGE UP (ref 10.3–14.5)
SODIUM SERPL-SCNC: 139 MMOL/L — SIGNIFICANT CHANGE UP (ref 135–145)
WBC # BLD: 4.83 K/UL — SIGNIFICANT CHANGE UP (ref 3.8–10.5)
WBC # FLD AUTO: 4.83 K/UL — SIGNIFICANT CHANGE UP (ref 3.8–10.5)

## 2022-06-06 PROCEDURE — 99239 HOSP IP/OBS DSCHRG MGMT >30: CPT

## 2022-06-06 RX ORDER — ACETAMINOPHEN 500 MG
2 TABLET ORAL
Qty: 240 | Refills: 0
Start: 2022-06-06 | End: 2022-07-05

## 2022-06-06 RX ORDER — IBUPROFEN 200 MG
2 TABLET ORAL
Qty: 240 | Refills: 0
Start: 2022-06-06 | End: 2022-07-05

## 2022-06-06 RX ORDER — LIDOCAINE 4 G/100G
1 CREAM TOPICAL
Qty: 14 | Refills: 0
Start: 2022-06-06 | End: 2022-06-19

## 2022-06-06 RX ORDER — OXYCODONE HYDROCHLORIDE 5 MG/1
0.5 TABLET ORAL
Qty: 10 | Refills: 0
Start: 2022-06-06 | End: 2022-06-10

## 2022-06-06 RX ADMIN — LOSARTAN POTASSIUM 100 MILLIGRAM(S): 100 TABLET, FILM COATED ORAL at 06:28

## 2022-06-06 RX ADMIN — Medication 10 MILLIGRAM(S): at 14:54

## 2022-06-06 RX ADMIN — Medication 10 MILLIGRAM(S): at 13:54

## 2022-06-06 RX ADMIN — Medication 650 MILLIGRAM(S): at 06:29

## 2022-06-06 RX ADMIN — Medication 650 MILLIGRAM(S): at 07:29

## 2022-06-06 RX ADMIN — Medication 25 MILLIGRAM(S): at 06:29

## 2022-06-06 RX ADMIN — LIDOCAINE 1 PATCH: 4 CREAM TOPICAL at 10:33

## 2022-06-06 RX ADMIN — SERTRALINE 50 MILLIGRAM(S): 25 TABLET, FILM COATED ORAL at 11:55

## 2022-06-06 RX ADMIN — GABAPENTIN 300 MILLIGRAM(S): 400 CAPSULE ORAL at 11:55

## 2022-06-06 NOTE — PROGRESS NOTE ADULT - SUBJECTIVE AND OBJECTIVE BOX
PROGRESS NOTE:     Patient is a 72y old  Female who presents with a chief complaint of Fall/ Spinal Fracture (05 Jun 2022 09:34)      SUBJECTIVE / OVERNIGHT EVENTS:    ADDITIONAL REVIEW OF SYSTEMS:    MEDICATIONS  (STANDING):  diltiazem    Tablet 120 milliGRAM(s) Oral <User Schedule>  enoxaparin Injectable 40 milliGRAM(s) SubCutaneous every 24 hours  gabapentin 300 milliGRAM(s) Oral daily  gabapentin 600 milliGRAM(s) Oral at bedtime  hydrochlorothiazide 25 milliGRAM(s) Oral daily  influenza  Vaccine (HIGH DOSE) 0.7 milliLiter(s) IntraMuscular once  lidocaine   4% Patch 1 Patch Transdermal every 24 hours  losartan 100 milliGRAM(s) Oral daily  sertraline 50 milliGRAM(s) Oral daily    MEDICATIONS  (PRN):  acetaminophen     Tablet .. 650 milliGRAM(s) Oral every 6 hours PRN Temp greater or equal to 38C (100.4F), Mild Pain (1 - 3)  ketorolac 10 milliGRAM(s) Oral every 6 hours PRN Moderate Pain (4 - 6)  melatonin 3 milliGRAM(s) Oral at bedtime PRN Insomnia  oxyCODONE    IR 2.5 milliGRAM(s) Oral every 6 hours PRN Severe Pain (7 - 10)  senna 2 Tablet(s) Oral at bedtime PRN Constipation      CAPILLARY BLOOD GLUCOSE        I&O's Summary      PHYSICAL EXAM:  Vital Signs Last 24 Hrs  T(C): 37.1 (06 Jun 2022 06:13), Max: 37.1 (06 Jun 2022 06:13)  T(F): 98.7 (06 Jun 2022 06:13), Max: 98.7 (06 Jun 2022 06:13)  HR: 72 (06 Jun 2022 06:13) (72 - 88)  BP: 139/78 (06 Jun 2022 06:13) (138/70 - 150/84)  BP(mean): --  RR: 17 (06 Jun 2022 06:13) (17 - 18)  SpO2: 99% (06 Jun 2022 06:13) (99% - 100%)    CONSTITUTIONAL: NAD, well-developed  RESPIRATORY: Normal respiratory effort; lungs are clear to auscultation bilaterally  CARDIOVASCULAR: Regular rate and rhythm, normal S1 and S2, no murmur/rub/gallop; No lower extremity edema; Peripheral pulses are 2+ bilaterally  ABDOMEN: Nontender to palpation, normoactive bowel sounds, no rebound/guarding  MUSCULOSKELETAL: no clubbing or cyanosis of digits; no joint swelling or tenderness to palpation  PSYCH: A+O to person, place, and time; affect appropriate    LABS:                        13.2   4.83  )-----------( 180      ( 06 Jun 2022 06:00 )             40.0     06-06    139  |  101  |  17  ----------------------------<  97  3.6   |  27  |  0.60    Ca    9.6      06 Jun 2022 06:00  Phos  4.8     06-06  Mg     1.60     06-06                  RADIOLOGY & ADDITIONAL TESTS:  Results Reviewed:   Imaging Personally Reviewed:  Electrocardiogram Personally Reviewed:    COORDINATION OF CARE:  Care Discussed with Consultants/Other Providers [Y/N]:  Prior or Outpatient Records Reviewed [Y/N]:   PROGRESS NOTE:     Patient is a 72y old  Female who presents with a chief complaint of Fall/ Spinal Fracture (05 Jun 2022 09:34)      SUBJECTIVE / OVERNIGHT EVENTS: No acute events overnight. This AM patient seen and examined at bedside.     ADDITIONAL REVIEW OF SYSTEMS: Negative     MEDICATIONS  (STANDING):  diltiazem    Tablet 120 milliGRAM(s) Oral <User Schedule>  enoxaparin Injectable 40 milliGRAM(s) SubCutaneous every 24 hours  gabapentin 300 milliGRAM(s) Oral daily  gabapentin 600 milliGRAM(s) Oral at bedtime  hydrochlorothiazide 25 milliGRAM(s) Oral daily  influenza  Vaccine (HIGH DOSE) 0.7 milliLiter(s) IntraMuscular once  lidocaine   4% Patch 1 Patch Transdermal every 24 hours  losartan 100 milliGRAM(s) Oral daily  sertraline 50 milliGRAM(s) Oral daily    MEDICATIONS  (PRN):  acetaminophen     Tablet .. 650 milliGRAM(s) Oral every 6 hours PRN Temp greater or equal to 38C (100.4F), Mild Pain (1 - 3)  ketorolac 10 milliGRAM(s) Oral every 6 hours PRN Moderate Pain (4 - 6)  melatonin 3 milliGRAM(s) Oral at bedtime PRN Insomnia  oxyCODONE    IR 2.5 milliGRAM(s) Oral every 6 hours PRN Severe Pain (7 - 10)  senna 2 Tablet(s) Oral at bedtime PRN Constipation      CAPILLARY BLOOD GLUCOSE        I&O's Summary      PHYSICAL EXAM:  Vital Signs Last 24 Hrs  T(C): 37.1 (06 Jun 2022 06:13), Max: 37.1 (06 Jun 2022 06:13)  T(F): 98.7 (06 Jun 2022 06:13), Max: 98.7 (06 Jun 2022 06:13)  HR: 72 (06 Jun 2022 06:13) (72 - 88)  BP: 139/78 (06 Jun 2022 06:13) (138/70 - 150/84)  BP(mean): --  RR: 17 (06 Jun 2022 06:13) (17 - 18)  SpO2: 99% (06 Jun 2022 06:13) (99% - 100%)    GENERAL: NAD, lying in bed comfortably  HEAD:  Atraumatic, Normocephalic  EYES: EOMI, PERRLA, conjunctiva and sclera clear  ENT: Moist mucous membranes  NECK: Supple, No JVD  CHEST/LUNG: Clear to auscultation bilaterally; No rales, rhonchi, wheezing, or rubs. Unlabored respirations  HEART: Regular rate and rhythm; No murmurs, rubs, or gallops  ABDOMEN: BSx4; Soft, nontender, nondistended  EXTREMITIES:  2+ Peripheral Pulses, brisk capillary refill. No clubbing, cyanosis, or edema  NERVOUS SYSTEM:  A&Ox3, no focal deficits   SKIN: No rashes or lesions    LABS:                        13.2   4.83  )-----------( 180      ( 06 Jun 2022 06:00 )             40.0     06-06    139  |  101  |  17  ----------------------------<  97  3.6   |  27  |  0.60    Ca    9.6      06 Jun 2022 06:00  Phos  4.8     06-06  Mg     1.60     06-06                  RADIOLOGY & ADDITIONAL TESTS:  Results Reviewed:   Imaging Personally Reviewed:  Electrocardiogram Personally Reviewed:    COORDINATION OF CARE:  Care Discussed with Consultants/Other Providers [Y/N]:  Prior or Outpatient Records Reviewed [Y/N]:

## 2022-06-06 NOTE — PROGRESS NOTE ADULT - PROBLEM SELECTOR PLAN 4
Diet: DASH  DVT PPx: Lovenox    Dispo: Home w/PT. Daughter will be available to accept patient on Monday.

## 2022-06-06 NOTE — PROGRESS NOTE ADULT - PROBLEM SELECTOR PLAN 3
C/w home sertraline 50 qd  C/w home gabapentin 300 BID

## 2022-06-06 NOTE — PROGRESS NOTE ADULT - PROBLEM SELECTOR PLAN 2
Hypertensive inpatient.   Home Meds: HCTZ 100qd, Losartan 25qd, Diltiazem 120 qhs   Patient in pain, not using oxycodone due to fear of using opioids, likely contributing to hypertension  - c/w home meds inpatient   - pain control as above   - consider increasing doses if persistently hypertensive  - monitor BP
Normotensive inpatient.   Home Meds: HCTZ 100qd, Losartan 25qd, Diltiazem 120 qhs   - c/w home meds inpatient   - monitor BP
Hypertensive inpatient.   Home Meds: HCTZ 100qd, Losartan 25qd, Diltiazem 120 qhs   Patient in pain, not using oxycodone due to fear of using opioids, likely contributing to hypertension  - c/w home meds inpatient   - pain control as above   - consider increasing doses if persistently hypertensive  - monitor BP
Hypertensive inpatient.   Home Meds: HCTZ 100qd, Losartan 25qd, Diltiazem 120 qhs   Patient in pain, not using oxycodone due to fear of using opioids, likely contributing to hypertension  - c/w home meds inpatient   - pain control as above   - consider increasing doses if persistently hypertensive  - monitor BP

## 2022-06-06 NOTE — PROGRESS NOTE ADULT - PROBLEM SELECTOR PLAN 1
Patient with likely mechanical fall, rushing to catch flight, no LOC, no head-strike, no radiating pain.   CT w L1 endplate fracture  NSG eval in ED, no acute intervention, rec PT and pain control  - PT consulted, rec home PT, patient would like re-evaluation over weekend and is now considering rehab if recommended again   - brace provided  - Pain control with tylenol, toradol. If needed Oxycodone 2.5. Can up-titrate based on pain assessment; patient not using the oxy per personal preference.   - add lido patch  - Outpatient follow up in 2 weeks with Dr. White (Neurosurgery) Patient with likely mechanical fall, rushing to catch flight, no LOC, no head-strike, no radiating pain.   CT w L1 endplate fracture  NSG eval in ED, no acute intervention, rec PT and pain control  - PT consulted, rec home PT  - brace provided  - Pain control with tylenol, toradol. If needed Oxycodone 2.5. Can up-titrate based on pain assessment; patient not using the oxy per personal preference.   - add lido patch  - Outpatient follow up in 2 weeks with Dr. White (Neurosurgery)

## 2022-06-22 ENCOUNTER — APPOINTMENT (OUTPATIENT)
Dept: SPINE | Facility: CLINIC | Age: 72
End: 2022-06-22
Payer: MEDICARE

## 2022-06-22 VITALS — WEIGHT: 160 LBS | BODY MASS INDEX: 29.44 KG/M2 | HEIGHT: 62 IN

## 2022-06-22 VITALS — SYSTOLIC BLOOD PRESSURE: 158 MMHG | DIASTOLIC BLOOD PRESSURE: 86 MMHG | HEART RATE: 63 BPM | OXYGEN SATURATION: 95 %

## 2022-06-22 DIAGNOSIS — M48.062 SPINAL STENOSIS, LUMBAR REGION WITH NEUROGENIC CLAUDICATION: ICD-10-CM

## 2022-06-22 PROCEDURE — 99214 OFFICE O/P EST MOD 30 MIN: CPT

## 2022-07-22 ENCOUNTER — APPOINTMENT (OUTPATIENT)
Dept: RADIOLOGY | Facility: IMAGING CENTER | Age: 72
End: 2022-07-22

## 2022-07-22 ENCOUNTER — OUTPATIENT (OUTPATIENT)
Dept: OUTPATIENT SERVICES | Facility: HOSPITAL | Age: 72
LOS: 1 days | End: 2022-07-22
Payer: MEDICARE

## 2022-07-22 DIAGNOSIS — M48.062 SPINAL STENOSIS, LUMBAR REGION WITH NEUROGENIC CLAUDICATION: ICD-10-CM

## 2022-07-22 DIAGNOSIS — C49.9 MALIGNANT NEOPLASM OF CONNECTIVE AND SOFT TISSUE, UNSPECIFIED: Chronic | ICD-10-CM

## 2022-07-22 DIAGNOSIS — S32.010D WEDGE COMPRESSION FRACTURE OF FIRST LUMBAR VERTEBRA, SUBSEQUENT ENCOUNTER FOR FRACTURE WITH ROUTINE HEALING: ICD-10-CM

## 2022-07-22 PROCEDURE — 72100 X-RAY EXAM L-S SPINE 2/3 VWS: CPT | Mod: 26

## 2022-07-22 PROCEDURE — 72100 X-RAY EXAM L-S SPINE 2/3 VWS: CPT

## 2022-07-27 ENCOUNTER — APPOINTMENT (OUTPATIENT)
Dept: SPINE | Facility: CLINIC | Age: 72
End: 2022-07-27

## 2022-07-27 VITALS
HEART RATE: 73 BPM | WEIGHT: 160 LBS | BODY MASS INDEX: 29.44 KG/M2 | OXYGEN SATURATION: 97 % | DIASTOLIC BLOOD PRESSURE: 84 MMHG | HEIGHT: 62 IN | SYSTOLIC BLOOD PRESSURE: 164 MMHG

## 2022-07-27 PROCEDURE — 99213 OFFICE O/P EST LOW 20 MIN: CPT

## 2022-07-27 RX ORDER — METRONIDAZOLE 500 MG/1
500 TABLET ORAL TWICE DAILY
Qty: 14 | Refills: 1 | Status: DISCONTINUED | COMMUNITY
Start: 2022-04-21 | End: 2022-07-27

## 2022-08-10 ENCOUNTER — OUTPATIENT (OUTPATIENT)
Dept: OUTPATIENT SERVICES | Facility: HOSPITAL | Age: 72
LOS: 1 days | End: 2022-08-10
Payer: MEDICARE

## 2022-08-10 ENCOUNTER — RESULT REVIEW (OUTPATIENT)
Age: 72
End: 2022-08-10

## 2022-08-10 ENCOUNTER — APPOINTMENT (OUTPATIENT)
Dept: CT IMAGING | Facility: CLINIC | Age: 72
End: 2022-08-10

## 2022-08-10 DIAGNOSIS — C49.9 MALIGNANT NEOPLASM OF CONNECTIVE AND SOFT TISSUE, UNSPECIFIED: Chronic | ICD-10-CM

## 2022-08-10 DIAGNOSIS — S32.010D WEDGE COMPRESSION FRACTURE OF FIRST LUMBAR VERTEBRA, SUBSEQUENT ENCOUNTER FOR FRACTURE WITH ROUTINE HEALING: ICD-10-CM

## 2022-08-10 PROCEDURE — 72131 CT LUMBAR SPINE W/O DYE: CPT

## 2022-08-10 PROCEDURE — 76376 3D RENDER W/INTRP POSTPROCES: CPT

## 2022-08-10 PROCEDURE — 76376 3D RENDER W/INTRP POSTPROCES: CPT | Mod: 26

## 2022-08-10 PROCEDURE — 72131 CT LUMBAR SPINE W/O DYE: CPT | Mod: 26,MH

## 2022-09-01 ENCOUNTER — OUTPATIENT (OUTPATIENT)
Dept: OUTPATIENT SERVICES | Facility: HOSPITAL | Age: 72
LOS: 1 days | End: 2022-09-01
Payer: MEDICARE

## 2022-09-01 ENCOUNTER — APPOINTMENT (OUTPATIENT)
Dept: ULTRASOUND IMAGING | Facility: IMAGING CENTER | Age: 72
End: 2022-09-01

## 2022-09-01 ENCOUNTER — RESULT REVIEW (OUTPATIENT)
Age: 72
End: 2022-09-01

## 2022-09-01 ENCOUNTER — APPOINTMENT (OUTPATIENT)
Dept: MAMMOGRAPHY | Facility: IMAGING CENTER | Age: 72
End: 2022-09-01

## 2022-09-01 DIAGNOSIS — C49.9 MALIGNANT NEOPLASM OF CONNECTIVE AND SOFT TISSUE, UNSPECIFIED: Chronic | ICD-10-CM

## 2022-09-01 DIAGNOSIS — Z00.00 ENCOUNTER FOR GENERAL ADULT MEDICAL EXAMINATION WITHOUT ABNORMAL FINDINGS: ICD-10-CM

## 2022-09-01 PROCEDURE — 77067 SCR MAMMO BI INCL CAD: CPT | Mod: 26

## 2022-09-01 PROCEDURE — 77067 SCR MAMMO BI INCL CAD: CPT

## 2022-09-01 PROCEDURE — 77063 BREAST TOMOSYNTHESIS BI: CPT | Mod: 26

## 2022-09-01 PROCEDURE — 77063 BREAST TOMOSYNTHESIS BI: CPT

## 2022-09-01 PROCEDURE — 76641 ULTRASOUND BREAST COMPLETE: CPT | Mod: 26,50

## 2022-09-01 PROCEDURE — 76641 ULTRASOUND BREAST COMPLETE: CPT

## 2022-09-14 ENCOUNTER — APPOINTMENT (OUTPATIENT)
Dept: RADIOLOGY | Facility: IMAGING CENTER | Age: 72
End: 2022-09-14

## 2022-09-14 ENCOUNTER — OUTPATIENT (OUTPATIENT)
Dept: OUTPATIENT SERVICES | Facility: HOSPITAL | Age: 72
LOS: 1 days | End: 2022-09-14
Payer: MEDICARE

## 2022-09-14 DIAGNOSIS — S31.010D LACERATION WITHOUT FOREIGN BODY OF LOWER BACK AND PELVIS WITHOUT PENETRATION INTO RETROPERITONEUM, SUBSEQUENT ENCOUNTER: ICD-10-CM

## 2022-09-14 DIAGNOSIS — C49.9 MALIGNANT NEOPLASM OF CONNECTIVE AND SOFT TISSUE, UNSPECIFIED: Chronic | ICD-10-CM

## 2022-09-14 PROCEDURE — 72100 X-RAY EXAM L-S SPINE 2/3 VWS: CPT

## 2022-09-14 PROCEDURE — 72100 X-RAY EXAM L-S SPINE 2/3 VWS: CPT | Mod: 26

## 2022-09-21 ENCOUNTER — APPOINTMENT (OUTPATIENT)
Dept: SPINE | Facility: CLINIC | Age: 72
End: 2022-09-21

## 2022-09-21 VITALS
HEART RATE: 65 BPM | WEIGHT: 160 LBS | BODY MASS INDEX: 29.44 KG/M2 | OXYGEN SATURATION: 97 % | SYSTOLIC BLOOD PRESSURE: 174 MMHG | DIASTOLIC BLOOD PRESSURE: 82 MMHG | HEIGHT: 62 IN

## 2022-09-21 DIAGNOSIS — S32.010D WEDGE COMPRESSION FRACTURE OF FIRST LUMBAR VERTEBRA, SUBSEQUENT ENCOUNTER FOR FRACTURE WITH ROUTINE HEALING: ICD-10-CM

## 2022-09-21 PROCEDURE — 99213 OFFICE O/P EST LOW 20 MIN: CPT

## 2023-07-13 ENCOUNTER — APPOINTMENT (OUTPATIENT)
Dept: OBGYN | Facility: CLINIC | Age: 73
End: 2023-07-13
Payer: MEDICARE

## 2023-07-13 VITALS
HEIGHT: 62 IN | BODY MASS INDEX: 29.26 KG/M2 | SYSTOLIC BLOOD PRESSURE: 147 MMHG | DIASTOLIC BLOOD PRESSURE: 87 MMHG | WEIGHT: 159 LBS

## 2023-07-13 DIAGNOSIS — Z12.11 ENCOUNTER FOR SCREENING FOR MALIGNANT NEOPLASM OF COLON: ICD-10-CM

## 2023-07-13 DIAGNOSIS — R10.2 PELVIC AND PERINEAL PAIN: ICD-10-CM

## 2023-07-13 DIAGNOSIS — Z01.419 ENCOUNTER FOR GYNECOLOGICAL EXAMINATION (GENERAL) (ROUTINE) W/OUT ABNORMAL FINDINGS: ICD-10-CM

## 2023-07-13 DIAGNOSIS — R92.2 INCONCLUSIVE MAMMOGRAM: ICD-10-CM

## 2023-07-13 PROCEDURE — G0101: CPT

## 2023-07-13 RX ORDER — ESCITALOPRAM OXALATE 5 MG/5ML
SOLUTION ORAL
Refills: 0 | Status: ACTIVE | COMMUNITY

## 2023-07-13 RX ORDER — METHOCARBAMOL 500 MG/1
500 TABLET, FILM COATED ORAL
Qty: 30 | Refills: 0 | Status: COMPLETED | COMMUNITY
Start: 2022-09-21 | End: 2023-07-13

## 2023-07-13 NOTE — HISTORY OF PRESENT ILLNESS
[FreeTextEntry1] : 72yo PM woman no gyn complalints\par \par Keeping busy-- for her [employer], land [employer]\par sister w dementia  and now she is living in her home\par \par \par divorce complete

## 2023-07-13 NOTE — PHYSICAL EXAM
[Chaperone Declined] : Patient declined chaperone [Appropriately responsive] : appropriately responsive [No Lymphadenopathy] : no lymphadenopathy [Soft] : soft [Non-tender] : non-tender [No Lesions] : no lesions [Oriented x3] : oriented x3 [Examination Of The Breasts] : a normal appearance [No Masses] : no breast masses were palpable [Labia Majora] : normal [Labia Minora] : normal [Normal] : normal [Uterine Adnexae] : normal [Atrophy] : atrophy [FreeTextEntry4] : 6 o'clock agglutination

## 2023-07-18 LAB — CYTOLOGY CVX/VAG DOC THIN PREP: ABNORMAL

## 2023-07-24 NOTE — ED ADULT NURSE NOTE - PRIMARY CARE PROVIDER
unk Ear Wedge Repair Text: A wedge excision was completed by carrying down an excision through the full thickness of the ear and cartilage with an inward facing Burow's triangle. The wound was then closed in a layered fashion.

## 2023-08-16 ENCOUNTER — APPOINTMENT (OUTPATIENT)
Dept: GASTROENTEROLOGY | Facility: CLINIC | Age: 73
End: 2023-08-16
Payer: MEDICARE

## 2023-08-16 VITALS
HEART RATE: 94 BPM | OXYGEN SATURATION: 96 % | WEIGHT: 154 LBS | TEMPERATURE: 97 F | BODY MASS INDEX: 28.34 KG/M2 | DIASTOLIC BLOOD PRESSURE: 78 MMHG | HEIGHT: 62 IN | SYSTOLIC BLOOD PRESSURE: 129 MMHG

## 2023-08-16 DIAGNOSIS — R14.0 ABDOMINAL DISTENSION (GASEOUS): ICD-10-CM

## 2023-08-16 DIAGNOSIS — Z12.11 ENCOUNTER FOR SCREENING FOR MALIGNANT NEOPLASM OF COLON: ICD-10-CM

## 2023-08-16 PROCEDURE — 99213 OFFICE O/P EST LOW 20 MIN: CPT

## 2023-08-16 RX ORDER — SODIUM PICOSULFATE, MAGNESIUM OXIDE, AND ANHYDROUS CITRIC ACID 10; 3.5; 12 MG/160ML; G/160ML; G/160ML
10-3.5-12 MG-GM LIQUID ORAL
Qty: 1 | Refills: 0 | Status: ACTIVE | COMMUNITY
Start: 2023-08-16 | End: 1900-01-01

## 2023-08-16 NOTE — ASSESSMENT
[FreeTextEntry1] : This is a 73-year-old female with history of adenomatous colon polyps.  I recommend surveillance colonoscopy.  I explained to her the risk benefits of the procedure.  Risk including but not limited to bleeding, perforation, infection and adverse medication reaction.  Questions were answered.  She stated understanding.  I recommend she call the office to let me know what prescription "digestive enzymes" her primary care had prescribed for her.  In the meantime she will continue on her current probiotics as it seems to be helping with symptoms of bloating.

## 2023-08-16 NOTE — REASON FOR VISIT
[Follow-up] : a follow-up of an existing diagnosis [FreeTextEntry1] : bloating, hx of adenomatous colon polyp

## 2023-08-16 NOTE — PHYSICAL EXAM
[Alert] : alert [Normal Voice/Communication] : normal voice/communication [Healthy Appearing] : healthy appearing [No Acute Distress] : no acute distress [Sclera] : the sclera and conjunctiva were normal [Hearing Threshold Finger Rub Not [place]] : hearing was normal [Normal Lips/Gums] : the lips and gums were normal [Oropharynx] : the oropharynx was normal [Normal Appearance] : the appearance of the neck was normal [No Neck Mass] : no neck mass was observed [No Respiratory Distress] : no respiratory distress [No Acc Muscle Use] : no accessory muscle use [Respiration, Rhythm And Depth] : normal respiratory rhythm and effort [Auscultation Breath Sounds / Voice Sounds] : lungs were clear to auscultation bilaterally [Heart Rate And Rhythm] : heart rate was normal and rhythm regular [Normal S1, S2] : normal S1 and S2 [Murmurs] : no murmurs [Bowel Sounds] : normal bowel sounds [Abdomen Tenderness] : non-tender [No Masses] : no abdominal mass palpated [Abdomen Soft] : soft [] : no hepatosplenomegaly [Oriented To Time, Place, And Person] : oriented to person, place, and time

## 2023-08-16 NOTE — HISTORY OF PRESENT ILLNESS
[FreeTextEntry1] : Violet presents for follow-up visit.  She relates that she saw her primary care physician for bloating who gave her a prescription for "digestive enzymes".  She states that the copayment was too high.  She does not remember the name.  She is also taking probiotics.  Both the "digestive enzyme "and probiotics are helping with symptoms of bloating.  She denies changes in bowel habits.  She denies rectal bleeding or melena.  Last colonoscopy in 2019 with adenomatous colon polyp.  She had history of polyps in the past.

## 2023-09-06 ENCOUNTER — RESULT REVIEW (OUTPATIENT)
Age: 73
End: 2023-09-06

## 2023-09-06 ENCOUNTER — OUTPATIENT (OUTPATIENT)
Dept: OUTPATIENT SERVICES | Facility: HOSPITAL | Age: 73
LOS: 1 days | End: 2023-09-06
Payer: MEDICARE

## 2023-09-06 ENCOUNTER — APPOINTMENT (OUTPATIENT)
Dept: MAMMOGRAPHY | Facility: IMAGING CENTER | Age: 73
End: 2023-09-06
Payer: MEDICARE

## 2023-09-06 DIAGNOSIS — C49.9 MALIGNANT NEOPLASM OF CONNECTIVE AND SOFT TISSUE, UNSPECIFIED: Chronic | ICD-10-CM

## 2023-09-06 DIAGNOSIS — Z01.419 ENCOUNTER FOR GYNECOLOGICAL EXAMINATION (GENERAL) (ROUTINE) WITHOUT ABNORMAL FINDINGS: ICD-10-CM

## 2023-09-06 PROCEDURE — 77067 SCR MAMMO BI INCL CAD: CPT

## 2023-09-06 PROCEDURE — 77063 BREAST TOMOSYNTHESIS BI: CPT

## 2023-09-06 PROCEDURE — 77067 SCR MAMMO BI INCL CAD: CPT | Mod: 26

## 2023-09-06 PROCEDURE — 77063 BREAST TOMOSYNTHESIS BI: CPT | Mod: 26

## 2023-10-05 ENCOUNTER — APPOINTMENT (OUTPATIENT)
Dept: GASTROENTEROLOGY | Facility: HOSPITAL | Age: 73
End: 2023-10-05

## 2023-10-05 ENCOUNTER — TRANSCRIPTION ENCOUNTER (OUTPATIENT)
Age: 73
End: 2023-10-05

## 2023-10-05 ENCOUNTER — OUTPATIENT (OUTPATIENT)
Dept: OUTPATIENT SERVICES | Facility: HOSPITAL | Age: 73
LOS: 1 days | End: 2023-10-05
Payer: MEDICARE

## 2023-10-05 ENCOUNTER — RESULT REVIEW (OUTPATIENT)
Age: 73
End: 2023-10-05

## 2023-10-05 VITALS
SYSTOLIC BLOOD PRESSURE: 136 MMHG | HEART RATE: 61 BPM | OXYGEN SATURATION: 98 % | RESPIRATION RATE: 15 BRPM | DIASTOLIC BLOOD PRESSURE: 72 MMHG

## 2023-10-05 VITALS
HEART RATE: 73 BPM | RESPIRATION RATE: 26 BRPM | HEIGHT: 62 IN | WEIGHT: 151.9 LBS | SYSTOLIC BLOOD PRESSURE: 151 MMHG | DIASTOLIC BLOOD PRESSURE: 74 MMHG | TEMPERATURE: 97 F | OXYGEN SATURATION: 97 %

## 2023-10-05 DIAGNOSIS — D12.6 BENIGN NEOPLASM OF COLON, UNSPECIFIED: ICD-10-CM

## 2023-10-05 DIAGNOSIS — C49.9 MALIGNANT NEOPLASM OF CONNECTIVE AND SOFT TISSUE, UNSPECIFIED: Chronic | ICD-10-CM

## 2023-10-05 PROCEDURE — 88305 TISSUE EXAM BY PATHOLOGIST: CPT | Mod: 26

## 2023-10-05 PROCEDURE — 45385 COLONOSCOPY W/LESION REMOVAL: CPT | Mod: GC

## 2023-10-05 PROCEDURE — 88305 TISSUE EXAM BY PATHOLOGIST: CPT

## 2023-10-05 PROCEDURE — 45385 COLONOSCOPY W/LESION REMOVAL: CPT | Mod: PT

## 2023-10-05 DEVICE — NET RETRV ROT ROTH 2.5MMX230CM: Type: IMPLANTABLE DEVICE | Status: FUNCTIONAL

## 2023-10-05 RX ORDER — CLONAZEPAM 1 MG
1 TABLET ORAL
Refills: 0 | DISCHARGE

## 2023-10-05 RX ORDER — DOXEPIN HCL 100 MG
1 CAPSULE ORAL
Refills: 0 | DISCHARGE

## 2023-10-05 RX ORDER — SODIUM CHLORIDE 9 MG/ML
500 INJECTION INTRAMUSCULAR; INTRAVENOUS; SUBCUTANEOUS
Refills: 0 | Status: COMPLETED | OUTPATIENT
Start: 2023-10-05 | End: 2023-10-05

## 2023-10-05 RX ORDER — LOSARTAN/HYDROCHLOROTHIAZIDE 100MG-25MG
1 TABLET ORAL
Qty: 0 | Refills: 0 | DISCHARGE

## 2023-10-05 RX ORDER — DILTIAZEM HCL 120 MG
1 CAPSULE, EXT RELEASE 24 HR ORAL
Qty: 0 | Refills: 0 | DISCHARGE

## 2023-10-05 RX ORDER — SERTRALINE 25 MG/1
50 TABLET, FILM COATED ORAL
Qty: 0 | Refills: 0 | DISCHARGE

## 2023-10-05 RX ORDER — GABAPENTIN 400 MG/1
2 CAPSULE ORAL
Qty: 0 | Refills: 0 | DISCHARGE

## 2023-10-05 RX ORDER — ESCITALOPRAM OXALATE 10 MG/1
1 TABLET, FILM COATED ORAL
Refills: 0 | DISCHARGE

## 2023-10-05 RX ADMIN — SODIUM CHLORIDE 30 MILLILITER(S): 9 INJECTION INTRAMUSCULAR; INTRAVENOUS; SUBCUTANEOUS at 10:28

## 2023-10-05 NOTE — PRE-ANESTHESIA EVALUATION ADULT - NSANTHOSAYNRD_GEN_A_CORE
No. HIRAL screening performed.  STOP BANG Legend: 0-2 = LOW Risk; 3-4 = INTERMEDIATE Risk; 5-8 = HIGH Risk

## 2023-10-05 NOTE — ASU DISCHARGE PLAN (ADULT/PEDIATRIC) - NS MD DC FALL RISK RISK
For information on Fall & Injury Prevention, visit: [email or web address] OR  [email or web address] OR  https://www.cdc.gov/steadi/patient.html

## 2023-10-05 NOTE — ASU PATIENT PROFILE, ADULT - NS TRANSFER EYEGLASSES PAIRS
"MEDICATION MANAGEMENT NOTE        6 [facility]      Name and Date of Birth:  Ramirez Bartholomew 54 y o  1968    Date of Visit: Snehal 15, 2023    SUBJECTIVE:  CC: Alfonzo Varela presents today for follow up on \"I am 2 weeks post op from my hip and doing pretty good\"; MDD, LISS, PD, ADHD, mental health    Alfonzo Varela notes pain 7/10 from muscle contraction, rather than hip pain per se  Doing PT  Mental health is more positve, better too  She feels a bit tried on seroquel and would like to lower that dose today    Feels some anxiety now that the hip is repaired \"I cannot just use it as an excuse and hide away\"    No pressing issues/concerns other than desire to be on less medication  Overall making progress  Since our last visit, overall symptoms have been stable         Med Compliance: yes    HPI ROS:               ('was' notes: prior visit)  Medication Side Effects:  no, tired on seroquel a bit     Depression (10 worst):  5 (Was 6)   Anxiety (10 worst):  7 (Was 6)   Hallucinations or Psychosis  no (Was no)    Safety concerns (SI, HI, etc):  no    Sleep: (NM = Nightmares)  sleeping a bit better, less pain (Was difficult, still can only sleep on one side)            Energy:  still low (Was ok, lays down due to pain)   Appetite:  decent appetite, eating better with less nausea related to pain (Was  Low with nausea)   Weight Change:  no      PHQ-2/9 Depression Screening    Little interest or pleasure in doing things: 2 - more than half the days  Feeling down, depressed, or hopeless: 2 - more than half the days  Trouble falling or staying asleep, or sleeping too much: 2 - more than half the days  Feeling tired or having little energy: 2 - more than half the days  Poor appetite or overeatin - not at all  Feeling bad about yourself - or that you are a failure or have let yourself or your family down: 1 - several days  Trouble concentrating on things, such as reading the " newspaper or watching television: 3 - nearly every day  Moving or speaking so slowly that other people could have noticed  Or the opposite - being so fidgety or restless that you have been moving around a lot more than usual: 1 - several days  Thoughts that you would be better off dead, or of hurting yourself in some way: 0 - not at all  PHQ-9 Score: 13   PHQ-9 Interpretation: Moderate depression              Joaquin Morrison denies any side effects from medications unless noted above    Review Of Systems as noted above  Otherwise A relevant review of symptoms was otherwise negative    History Review:  The following portions of the patient's history were reviewed and documented: allergies, current medications, past family history, past medical history, past social history and problem list      Lab Review: Labs were reviewed and discussed with patient      OBJECTIVE:     MENTAL STATUS EXAM  Appearance:  age appropriate   Behavior:  pleasant, cooperative, with good eye contact   Speech:  Normal volume, regular rate and rhythm   Mood:  dysphoric, anxious   Affect:  mood congruent, brighter with some improvement   Language: intact and appropriate for age, education, and intellect   Thought Process:  Linear and goal directed   Associations: intact associations   Thought Content:  normal and appropriate   Perceptual Disturbances: no auditory or visual hallcunations   Risk Potential / Abnormal Thoughts: Suicidal ideation - None  Homicidal ideation - None  Potential for aggression - No       Consciousness:  Alert & Awake   Sensorium:  Grossly oriented   Attention: attention span and concentration are age appropriate       Fund of Knowledge:  Memory: awareness of current events: yes  recent and remote memory grossly intact   Insight:  good   Judgment: good   Muscle Strength Muscle Tone: normal  normal   Gait/Station: uses walker   Motor Activity: no abnormal movements       Risks, Benefits And Possible Side Effects Of "Medications:    AGREE: Risks, benefits, and possible side effects of medications explained to Jd Caldwell and she (or legal representative) verbalizes understanding and agreement for treatment  Controlled Medication Discussion:     Jd Caldwell has been filling controlled prescriptions on time as prescribed according to [facility] [address] [facility] program    _____________________________________________________________    Recent labs:  No visits with results within 1 Month(s) from this visit  Latest known visit with results is:   Appointment on 01/20/2023   Component Date Value   • Magnesium 01/20/2023 2 5    • Phosphorus 01/20/2023 3 6    • PTH 01/20/2023 87 8 (H)    • Uric Acid 01/20/2023 5 8    • Color, UA 01/21/2023 Yellow    • Clarity, UA 01/21/2023 Clear    • Specific Gravity, UA 01/21/2023 1 010    • pH, UA 01/21/2023 7 0    • Leukocytes, UA 01/21/2023 Negative    • Nitrite, UA 01/21/2023 Negative    • Protein, UA 01/21/2023 30 (1+) (A)    • Glucose, UA 01/21/2023 Negative    • Ketones, UA 01/21/2023 Negative    • Urobilinogen, UA 01/21/2023 0 2    • Bilirubin, UA 01/21/2023 Negative    • Occult Blood, UA 01/21/2023 Trace-lysed (A)    • RBC, UA 01/21/2023 2-4    • WBC, UA 01/21/2023 None Seen    • Epithelial Cells 01/21/2023 Occasional    • Bacteria, UA 01/21/2023 None Seen    • Creatinine, Ur 01/20/2023 146 2    • Protein Urine Random 01/20/2023 275    • Prot/Creat Ratio, Ur 01/20/2023 1 88 (H)          Past Psychiatric History  Previous diagnoses include depression, anxiety, ADHD  Prior outpatient psychiatric treatment: yes, Dr Luis Neal  Prior therapy: no  Prior inpatient psychiatric treatment: no  Prior suicide attempts: no  Prior self harm: no  Prior violence or aggression: no    Past Social History:  The patient grew up in [facility]  Childhood was described as \"good\"     During childhood, parents were together  They have 2 sister(s) and 1 brother(s)   Patient is middle in birth " order     Abuse/neglect: none     As far as the patient (or present family member) is aware, overall childhood development: Patient notes ADHD symptoms in childhood and grades were not great, but no developmental delays     Education level: LPN/associates   Current occupation: after fall, had to stop  Marital status: never   Children: Rashaad Poon ( )  Current Living Situation: the patient lives alone   Social support: good family support, a friend     Restorationist Affiliation: no   experience: no  Legal history: no  Access to Guns: no     Substance use and treatment:  Tobacco use: yes  Caffeine Use: yes  ETOH use: no  Other substance use: marijuana once a week  Not prescribed, but thought to get her card      Endorses previous experimentation with: marijuana      Longest clean time: not applicable  History of Inpatient/Outpatient rehabilitation program: no        Traumatic History:      Abuse: none  Other Traumatic Events: none      Family Psychiatric History:      Psychiatric Illness:      Mom, son - anxiety  Substance Abuse:       Son (opioids, )  Suicide Attempts:        no family history of suicide attempts     Denies bipolar disorder, schizophrenaia    Family History   Problem Relation Age of Onset   • Hypertension Mother    • Cancer Father         PROSTATE   • Atrial fibrillation Father    • Hypertension Father    • Prostate cancer Father    • Hypertension Sister    • Breast cancer Sister    • Diabetes Brother    • Cancer Family    • Breast cancer Sister          Medical / Surgical History:    Past Medical History:   Diagnosis Date   • Allergic    • Anemia    • Anxiety    • Arthritis    • CHF (congestive heart failure) ([facility] [address] )    • Chronic kidney disease    • Coronary artery disease 2021   • Depression    • Hyperlipidemia 2021   • Hypertension 2021   • Osteoarthritis      Past Surgical History:   Procedure Laterality Date   • CT NEEDLE BIOPSY KIDNEY  3/2/2021 1 pair • ECTOPIC PREGNANCY SURGERY     • IR BIOPSY KIDNEY RANDOM  2/17/2021   • IR THORACENTESIS  1/12/2021   • JOINT REPLACEMENT     • ORTHOPEDIC SURGERY     • GA ARTHRP ACETBLR/PROX FEM PROSTC AGRFT/ALGRFT Left 3/5/2018    Procedure: ARTHROPLASTY HIP TOTAL;  Surgeon: Andrzej Ignacio DO;  Location: [facility] OR;  Service: Orthopedics   • RENAL BIOPSY  03/02/2021   • WISDOM TOOTH EXTRACTION      x1       Assessment/Plan:        Diagnoses and all orders for this visit:    Severe episode of recurrent major depressive disorder, without psychotic features ([address] )  -     methylphenidate (RITALIN) 20 MG tablet; Take 1 tablet (20 mg total) by mouth 2 (two) times a day Max Daily Amount: 40 mg Do not start before June 22, 2023   -     PARoxetine (PAXIL) 30 mg tablet; Take 1 5 tablets (45 mg total) by mouth every morning  -     QUEtiapine (SEROquel) 50 mg tablet; Take 1 tablet (50 mg total) by mouth daily at bedtime    LISS (generalized anxiety disorder)  -     PARoxetine (PAXIL) 30 mg tablet; Take 1 5 tablets (45 mg total) by mouth every morning    Panic disorder without agoraphobia  -     PARoxetine (PAXIL) 30 mg tablet; Take 1 5 tablets (45 mg total) by mouth every morning    Other orders  -     Eliquis 2 5 MG  -     methocarbamol (ROBAXIN) 500 mg tablet; Take 500 mg by mouth every 6 (six) hours as needed  -     acetaminophen (TYLENOL) 500 mg tablet; Take 500 mg by mouth every 4 (four) hours as needed  -     oxyCODONE (ROXICODONE) 5 immediate release tablet; Take 5 mg by mouth every 4 (four) hours as needed        ______________________________________________________________________  MDD  LISS  Panic Disorder  ADHD, Inattentive  Marijuana use    MDD- not at goal  LISS - not at goal  Panic disorder  Marijuana use- she may consider medical marijuana in future (helps mostly with pain)  - Differential includes Complex bereavement, Adjustment disorder        Alfonzo Coil is a bit better, in large part because surgery was successful and pain "is a bit less  She feels oversedated on seroquel and wants to reduce, and I think appropriate  Otherwise, does not want to change medications  She has CKD and other medical issues  BP ok today  High dose of paxil overall given her CKD  She has tolerated medications well and has no concerns presently and feels stable overall, thus we agreed not to make any changes today  CONSIDER NAC ( consider hypotension, delayed clotting, GI upset and other side effect considerations, including drug interactions or amplification of other drug effects) -   - Excoriation/picking is an issues she deals with , triggered by anxiety  Could be considering supplements or medication changes     She is on disability now, predominately for mental health but also medical components       From a social perspective, she has great family supports and a good hugo friend she has known for years    Safety Risk Assessment: see above   In considering risk and protective factors, suicide risk and safety risk are low presently  Additional Assessment:  Previous psychotropic medication trials:   Xanax  paxil  seroquel  wellbutrin (to quick smoking) years ago, had a rash, no significant issues and resolved  Buspar - \"I felt out of it and sick, could not eat\"  Ritalin    Scales:         Treatment Plan:        Patient has been educated about their diagnosis and treatment modalities  They voiced understanding and agreement with the following plan:    Discussed medications and if treatment adjustment was needed/desired      1) MEDS:           - Paxil 45mg daily   - Ritalin 20mg BID   - REDUCE Seroquel 100mg HS to 50mg HS (6/14/2023)     2) Labs:    -  5/1/2023; A1c 5 4   - 1/20/2023 - , HDL 57, LDL 85; Fasting glucose 89; Cr 1 86, eGFR 30     3) Therapy:    - Referred 3/23/2023 (STILL WAITING)     4) Medical:    - Pt will f/u with other providers as needed     5) Other: Support as needed   - Was LPN, 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   - Son passed away from fentanyl OD 2020, she lost father 2018    - good family support     6) Follow up:   - Follow up in 2 mo   - Patient will call if issues or concerns      7) Treatment Plan:    - Enacted 3/23/2023     Discussed self monitoring of symptoms, and symptom monitoring tools  Patient has been informed of 24 hours and weekend coverage for urgent situations accessed by calling the main clinic phone number               Psychotherapy in session:  Time spent performing psychotherapy:       Visit Time    Visit Start Time: 136  Visit Stop Time: 150p  Total Visit Duration: 14 minutes

## 2023-10-05 NOTE — ASU PATIENT PROFILE, ADULT - ABILITY TO HEAR (WITH HEARING AID OR HEARING APPLIANCE IF NORMALLY USED):
THANK YOU FOR SCHEDULING YOUR ANNUAL WELLNESS EXAM WITH ME TODAY.    HERE ARE SOME WELLNESS AND SAFETY TIPS THAT I WOULD LIKE YOU TO TAKE HOME WITH YOU TODAY.    1.  Try to eat a low-fat, high-fiber diet.  Try to consume at least 5 servings of fruits or vegetables daily.  Current research would suggest that the \"Mediterranean diet\" is the most healthy diet. Some tips on healthy eating are attached below.    2.  Get regular aerobic exercise almost every day.  This means 6 out of 7 days a week.  The ultimate goal is to get  30-45 minutes of aerobic activity in.  You don't have to count your pulse.  Just work up a sweat. If you can't work out everyday, strive to get 3.5 - 4 hours of aerobic activity in weekly.     3.  Always wear your seatbelt.    4.  If you drink alcohol, always drink in moderation.  This means on average 1- 2 drinks a day for a woman, and 2-3 drinks a day for a man. (One \"drink\" = 12 ounces beer= 4 ounces wine=one ounce of hard liquor) The reason for the difference is men and woman metabolize alcohol differently.    5.  If you smoke, please try to make a serious quit attempt.  I highly recommend you contact the [place] quit line to help with this process.  It is an excellent, free service.  The phone number is 6-882-QUIT-NOW.    6.  Keep up with all the screening tests that your doctor has recommended.  Screening tests are designed to  disease in the asymptomatic state, so we want to perform these when you are feeling well.  This allows us to  the disease in a much earlier state and make a significant impact on the outcome.    7.  If you're overweight, strive to increase exercise and decrease calorie intake such that you get your body mass index down to 25 or less.  Overweight, and obesity, fuel many of the problems we see an adult life.    8.  Reduce daily stress with 10 minutes of relaxation, silence, or meditation daily.    9.  As far as supplements go, there's not much  Adequate: hears normal conversation without difficulty evidence that a multiple vitamin once daily improves any outcomes.  On the other hand, it certainly doesn't appear to be harmful.  I am a proponent of vitamin D supplementation, especially in [place].  I like to see the blood level over 40.  If you're not having your blood levels monitored and adjusted by me, I would recommend 2000 international units of vitamin D 3 daily.    10.  If you do all of the above, you should be able to reduce your risk of all cardiovascular diseases, including heart disease and stroke.  In addition, we will reduce your risk of type 2 diabetes, and cancer death.     As always, please don't hesitate to call my office with any concerns or questions.    Dr. Torrey Norman MD   Family Medicine    [facility] [address] [place] [address]  Phone: [phone]  Fax: [phone]            How to Eat a Heart-Healthy Diet      Prepared for the subscribers of  Pharmacist’s Letter / Prescriber’s Letter to give to their patients.  Copyright © 2014 by [facility]  www.PharmacistsLetter.com , www.PrescribersLetter.com        Eating a heart-healthy diet can lower your risk of problems like heart attacks, strokes, and diabetes.  Use the following tips as a guide for a heart-healthy diet. Remember not to get discouraged if you  indulge in a favorite once in a while.    DO eat:  • Fruits and veggies  • Whole grains such as brown rice and oatmeal  • Low-fat dairy such as cheese, milk, or yogurt  • Poultry such as chicken and turkey  • Fish  • Beans and peas  • Vegetable oils like canola or olive  • Nuts    LIMIT your intake of:  • Sweets like candy, ice cream, or baked goods  • Sugar-sweetened beverages such as soda,sweet tea, or coffee drinks  • Red meats such as beef and pork  • Saturated fats such as that found in processedmeats, animal fat, palm oil, coconut oil, etc.    Some heart-healthy diets include DASH, Mediterranean, American Heart Association  (AHA), and  USDA Choose My Plate. Pick one you can stay with long-term.    Reducing the sodium (salt) you eat can help keep your heart healthy by lowering your bloodpressure. In fact, if you have high blood pressure, reducing your salt by about one-half teaspoon per day can drop your blood pressure by about two points.  Aim for no more than about 2400 mg of sodium, or one teaspoon of table salt, per day. A teaspoon of sea salt has a little less sodium, and a teaspoon of kosher salt has about half as much.  Surprisingly, most salt in your diet doesn’t come from the shaker. Use these tips to cut back onsodium:  • Buy fresh, plain frozen, or canned “no salt added” food. Avoid canned or processed food.  • Use herbs, spices, and salt-free seasoning in cooking and at the table.  • Cook rice, pasta, and hot cereal without salt. Cut back on instant or flavored mixes.  • Cut back on frozen dinners, pizza, canned soups or broths, and salad dressings.  • Rinse canned foods to remove some salt.  • Choose ready-to-eat breakfast cereals low in sodium.  • Taste food before reaching for the salt shaker.  • Keep in mind the amount of food that has about 1000 mg of sodium: a large fast food burger or hot dog, one large slice or two regular slices of pizza, or one can of soup.    There are “apps” for your smartphone that track what you eat. Here are some examples:  • Calorie Counter & Diet Tracker (MyFitnessPal.com) for iPhone  • MyDashDiet (http://www.[employer]/mydashdiet/) for iPhone  • Sodium Tracker (https://[employer]/us/edwina/sodium-tracker/sv643704465) for iPhone  • EZ Sodium Tracker (http://First [employer]ight/page2.html) for iPhone and Android    More strategies to keep your heart healthy include:  • Exercising for around 30 minutes most days  • Stopping smoking  • Staying at a healthy weight  Making these changes can be tough. Try tackling one at a time for the best success.  [December 2013]                                                                                       How to lose weight    Losing weight is not easy.  All weight loss plans require reduction of calorie intake.  There are many ways to do this.  You can cut portions in half, you can decrease carbohydrates, you can eat more vegetables, and less fats.  The bottom line is, If you can eliminate 500 calories from your average daily intake, you will lose a pound a week.  This is a known metabolic formula.  You do not need to change anything else in your life to lose this weight.  We would like you to be physically active and exercise aerobically for 3.5-4 hours weekly.  However, exercise will not help you lose enough weight.      To do this you must first determine the number of calories you take it on an average day.  I suggest a three-day diet history to get a handle on how many calories you are taking in.  Get a calorie counter from the [employer], or an edwina for your smartphone (Calorie Counter & Diet Tracker (MyFitnessPal.com) for [employer] is a free edwina that will help to do this), or just Google \"calorie counter\" in your search engine.  For 3 days you will record everything that goes past your lips.    Add up all the calories for 3 days, and then divide by 3 to get your average daily intake.  In the process of doing this exercise you will learn a lot about where the \"easy\" calories are to eliminate.  You must eliminate 500 calories daily from your average intake.  You can use the edwina to help you count up calories during the day to make sure you don't exceed your limit.    This will take a lot of discipline on your part, but is always effective.  Do not think of this is a \"diet\" which implies that this will be temporary.  Think of this as a way of changing your eating style for the rest of your life.      Investigate the Internet for low glycemic index, versus high glycemic index foods.  For low caloric density versus high caloric density foods.  For the healthy  low carbohydrate diets such as the [employer] diet.    Here is an inexpensive book using these ideas: The Ultimate Volumetrics Diet: Smart, Simple, Science-Based Strategies for Losing Weight and Keeping It Off, by Danis Cabrera, Osvaldo Fox   Jan 8, 2013

## 2023-10-09 LAB — SURGICAL PATHOLOGY STUDY: SIGNIFICANT CHANGE UP

## 2023-10-18 ENCOUNTER — APPOINTMENT (OUTPATIENT)
Dept: GASTROENTEROLOGY | Facility: CLINIC | Age: 73
End: 2023-10-18

## 2024-01-07 ENCOUNTER — EMERGENCY (EMERGENCY)
Facility: HOSPITAL | Age: 74
LOS: 1 days | Discharge: HOME | End: 2024-01-07
Attending: EMERGENCY MEDICINE
Payer: MEDICARE

## 2024-01-07 VITALS
RESPIRATION RATE: 18 BRPM | DIASTOLIC BLOOD PRESSURE: 90 MMHG | SYSTOLIC BLOOD PRESSURE: 168 MMHG | OXYGEN SATURATION: 99 % | TEMPERATURE: 98 F | HEART RATE: 69 BPM

## 2024-01-07 VITALS
OXYGEN SATURATION: 98 % | RESPIRATION RATE: 18 BRPM | HEIGHT: 62 IN | SYSTOLIC BLOOD PRESSURE: 149 MMHG | WEIGHT: 153 LBS | TEMPERATURE: 99 F | HEART RATE: 76 BPM | DIASTOLIC BLOOD PRESSURE: 82 MMHG

## 2024-01-07 DIAGNOSIS — C49.9 MALIGNANT NEOPLASM OF CONNECTIVE AND SOFT TISSUE, UNSPECIFIED: Chronic | ICD-10-CM

## 2024-01-07 LAB
ALBUMIN SERPL ELPH-MCNC: 3.8 G/DL — SIGNIFICANT CHANGE UP (ref 3.3–5)
ALBUMIN SERPL ELPH-MCNC: 3.8 G/DL — SIGNIFICANT CHANGE UP (ref 3.3–5)
ALP SERPL-CCNC: 43 U/L — SIGNIFICANT CHANGE UP (ref 40–120)
ALP SERPL-CCNC: 43 U/L — SIGNIFICANT CHANGE UP (ref 40–120)
ALT FLD-CCNC: 20 U/L — SIGNIFICANT CHANGE UP (ref 10–45)
ALT FLD-CCNC: 20 U/L — SIGNIFICANT CHANGE UP (ref 10–45)
ANION GAP SERPL CALC-SCNC: 10 MMOL/L — SIGNIFICANT CHANGE UP (ref 5–17)
ANION GAP SERPL CALC-SCNC: 10 MMOL/L — SIGNIFICANT CHANGE UP (ref 5–17)
AST SERPL-CCNC: 53 U/L — HIGH (ref 10–40)
AST SERPL-CCNC: 53 U/L — HIGH (ref 10–40)
BASOPHILS # BLD AUTO: 0.05 K/UL — SIGNIFICANT CHANGE UP (ref 0–0.2)
BASOPHILS # BLD AUTO: 0.05 K/UL — SIGNIFICANT CHANGE UP (ref 0–0.2)
BASOPHILS NFR BLD AUTO: 0.9 % — SIGNIFICANT CHANGE UP (ref 0–2)
BASOPHILS NFR BLD AUTO: 0.9 % — SIGNIFICANT CHANGE UP (ref 0–2)
BILIRUB SERPL-MCNC: 0.7 MG/DL — SIGNIFICANT CHANGE UP (ref 0.2–1.2)
BILIRUB SERPL-MCNC: 0.7 MG/DL — SIGNIFICANT CHANGE UP (ref 0.2–1.2)
BUN SERPL-MCNC: 15 MG/DL — SIGNIFICANT CHANGE UP (ref 7–23)
BUN SERPL-MCNC: 15 MG/DL — SIGNIFICANT CHANGE UP (ref 7–23)
CALCIUM SERPL-MCNC: 9.6 MG/DL — SIGNIFICANT CHANGE UP (ref 8.4–10.5)
CALCIUM SERPL-MCNC: 9.6 MG/DL — SIGNIFICANT CHANGE UP (ref 8.4–10.5)
CHLORIDE SERPL-SCNC: 104 MMOL/L — SIGNIFICANT CHANGE UP (ref 96–108)
CHLORIDE SERPL-SCNC: 104 MMOL/L — SIGNIFICANT CHANGE UP (ref 96–108)
CO2 SERPL-SCNC: 22 MMOL/L — SIGNIFICANT CHANGE UP (ref 22–31)
CO2 SERPL-SCNC: 22 MMOL/L — SIGNIFICANT CHANGE UP (ref 22–31)
CREAT SERPL-MCNC: 0.57 MG/DL — SIGNIFICANT CHANGE UP (ref 0.5–1.3)
CREAT SERPL-MCNC: 0.57 MG/DL — SIGNIFICANT CHANGE UP (ref 0.5–1.3)
EGFR: 96 ML/MIN/1.73M2 — SIGNIFICANT CHANGE UP
EGFR: 96 ML/MIN/1.73M2 — SIGNIFICANT CHANGE UP
EOSINOPHIL # BLD AUTO: 0.1 K/UL — SIGNIFICANT CHANGE UP (ref 0–0.5)
EOSINOPHIL # BLD AUTO: 0.1 K/UL — SIGNIFICANT CHANGE UP (ref 0–0.5)
EOSINOPHIL NFR BLD AUTO: 1.7 % — SIGNIFICANT CHANGE UP (ref 0–6)
EOSINOPHIL NFR BLD AUTO: 1.7 % — SIGNIFICANT CHANGE UP (ref 0–6)
FLUAV AG NPH QL: SIGNIFICANT CHANGE UP
FLUAV AG NPH QL: SIGNIFICANT CHANGE UP
FLUBV AG NPH QL: SIGNIFICANT CHANGE UP
FLUBV AG NPH QL: SIGNIFICANT CHANGE UP
GLUCOSE SERPL-MCNC: 87 MG/DL — SIGNIFICANT CHANGE UP (ref 70–99)
GLUCOSE SERPL-MCNC: 87 MG/DL — SIGNIFICANT CHANGE UP (ref 70–99)
HCT VFR BLD CALC: 37 % — SIGNIFICANT CHANGE UP (ref 34.5–45)
HCT VFR BLD CALC: 37 % — SIGNIFICANT CHANGE UP (ref 34.5–45)
HGB BLD-MCNC: 11.7 G/DL — SIGNIFICANT CHANGE UP (ref 11.5–15.5)
HGB BLD-MCNC: 11.7 G/DL — SIGNIFICANT CHANGE UP (ref 11.5–15.5)
IMM GRANULOCYTES NFR BLD AUTO: 0.3 % — SIGNIFICANT CHANGE UP (ref 0–0.9)
IMM GRANULOCYTES NFR BLD AUTO: 0.3 % — SIGNIFICANT CHANGE UP (ref 0–0.9)
LYMPHOCYTES # BLD AUTO: 3.29 K/UL — SIGNIFICANT CHANGE UP (ref 1–3.3)
LYMPHOCYTES # BLD AUTO: 3.29 K/UL — SIGNIFICANT CHANGE UP (ref 1–3.3)
LYMPHOCYTES # BLD AUTO: 56.4 % — HIGH (ref 13–44)
LYMPHOCYTES # BLD AUTO: 56.4 % — HIGH (ref 13–44)
MAGNESIUM SERPL-MCNC: 1.8 MG/DL — SIGNIFICANT CHANGE UP (ref 1.6–2.6)
MAGNESIUM SERPL-MCNC: 1.8 MG/DL — SIGNIFICANT CHANGE UP (ref 1.6–2.6)
MCHC RBC-ENTMCNC: 29.3 PG — SIGNIFICANT CHANGE UP (ref 27–34)
MCHC RBC-ENTMCNC: 29.3 PG — SIGNIFICANT CHANGE UP (ref 27–34)
MCHC RBC-ENTMCNC: 31.6 GM/DL — LOW (ref 32–36)
MCHC RBC-ENTMCNC: 31.6 GM/DL — LOW (ref 32–36)
MCV RBC AUTO: 92.7 FL — SIGNIFICANT CHANGE UP (ref 80–100)
MCV RBC AUTO: 92.7 FL — SIGNIFICANT CHANGE UP (ref 80–100)
MONOCYTES # BLD AUTO: 0.49 K/UL — SIGNIFICANT CHANGE UP (ref 0–0.9)
MONOCYTES # BLD AUTO: 0.49 K/UL — SIGNIFICANT CHANGE UP (ref 0–0.9)
MONOCYTES NFR BLD AUTO: 8.4 % — SIGNIFICANT CHANGE UP (ref 2–14)
MONOCYTES NFR BLD AUTO: 8.4 % — SIGNIFICANT CHANGE UP (ref 2–14)
NEUTROPHILS # BLD AUTO: 1.88 K/UL — SIGNIFICANT CHANGE UP (ref 1.8–7.4)
NEUTROPHILS # BLD AUTO: 1.88 K/UL — SIGNIFICANT CHANGE UP (ref 1.8–7.4)
NEUTROPHILS NFR BLD AUTO: 32.3 % — LOW (ref 43–77)
NEUTROPHILS NFR BLD AUTO: 32.3 % — LOW (ref 43–77)
NRBC # BLD: 0 /100 WBCS — SIGNIFICANT CHANGE UP (ref 0–0)
NRBC # BLD: 0 /100 WBCS — SIGNIFICANT CHANGE UP (ref 0–0)
PLATELET # BLD AUTO: 230 K/UL — SIGNIFICANT CHANGE UP (ref 150–400)
PLATELET # BLD AUTO: 230 K/UL — SIGNIFICANT CHANGE UP (ref 150–400)
POTASSIUM SERPL-MCNC: 5.1 MMOL/L — SIGNIFICANT CHANGE UP (ref 3.5–5.3)
POTASSIUM SERPL-MCNC: 5.1 MMOL/L — SIGNIFICANT CHANGE UP (ref 3.5–5.3)
POTASSIUM SERPL-SCNC: 5.1 MMOL/L — SIGNIFICANT CHANGE UP (ref 3.5–5.3)
POTASSIUM SERPL-SCNC: 5.1 MMOL/L — SIGNIFICANT CHANGE UP (ref 3.5–5.3)
PROT SERPL-MCNC: 7.5 G/DL — SIGNIFICANT CHANGE UP (ref 6–8.3)
PROT SERPL-MCNC: 7.5 G/DL — SIGNIFICANT CHANGE UP (ref 6–8.3)
RBC # BLD: 3.99 M/UL — SIGNIFICANT CHANGE UP (ref 3.8–5.2)
RBC # BLD: 3.99 M/UL — SIGNIFICANT CHANGE UP (ref 3.8–5.2)
RBC # FLD: 12.9 % — SIGNIFICANT CHANGE UP (ref 10.3–14.5)
RBC # FLD: 12.9 % — SIGNIFICANT CHANGE UP (ref 10.3–14.5)
RSV RNA NPH QL NAA+NON-PROBE: SIGNIFICANT CHANGE UP
RSV RNA NPH QL NAA+NON-PROBE: SIGNIFICANT CHANGE UP
SARS-COV-2 RNA SPEC QL NAA+PROBE: DETECTED
SARS-COV-2 RNA SPEC QL NAA+PROBE: DETECTED
SODIUM SERPL-SCNC: 136 MMOL/L — SIGNIFICANT CHANGE UP (ref 135–145)
SODIUM SERPL-SCNC: 136 MMOL/L — SIGNIFICANT CHANGE UP (ref 135–145)
WBC # BLD: 5.83 K/UL — SIGNIFICANT CHANGE UP (ref 3.8–10.5)
WBC # BLD: 5.83 K/UL — SIGNIFICANT CHANGE UP (ref 3.8–10.5)
WBC # FLD AUTO: 5.83 K/UL — SIGNIFICANT CHANGE UP (ref 3.8–10.5)
WBC # FLD AUTO: 5.83 K/UL — SIGNIFICANT CHANGE UP (ref 3.8–10.5)

## 2024-01-07 PROCEDURE — 99285 EMERGENCY DEPT VISIT HI MDM: CPT | Mod: 25

## 2024-01-07 PROCEDURE — 85025 COMPLETE CBC W/AUTO DIFF WBC: CPT

## 2024-01-07 PROCEDURE — 71045 X-RAY EXAM CHEST 1 VIEW: CPT

## 2024-01-07 PROCEDURE — 99284 EMERGENCY DEPT VISIT MOD MDM: CPT

## 2024-01-07 PROCEDURE — 71045 X-RAY EXAM CHEST 1 VIEW: CPT | Mod: 26

## 2024-01-07 PROCEDURE — 80053 COMPREHEN METABOLIC PANEL: CPT

## 2024-01-07 PROCEDURE — 83735 ASSAY OF MAGNESIUM: CPT

## 2024-01-07 PROCEDURE — 87637 SARSCOV2&INF A&B&RSV AMP PRB: CPT

## 2024-01-07 RX ORDER — ACETAMINOPHEN 500 MG
2 TABLET ORAL
Qty: 40 | Refills: 0
Start: 2024-01-07 | End: 2024-01-11

## 2024-01-07 RX ORDER — IBUPROFEN 200 MG
1 TABLET ORAL
Qty: 9 | Refills: 0
Start: 2024-01-07 | End: 2024-01-09

## 2024-01-07 NOTE — ED PROVIDER NOTE - NS ED ROS FT
CONSTITUTIONAL: c/o weakness   EYES/ENT: No visual changes;  No throat pain   NECK: No pain   RESPIRATORY: c/o cough   CARDIOVASCULAR: No chest pain, palpitations  GASTROINTESTINAL: No abdominal or epigastric pain. No nausea, vomiting, diarrhea or constipation.   GENITOURINARY: No dysuria, frequency   NEUROLOGICAL: No numbness or weakness  SKIN: No rashes, or lesions     All other review of systems is negative unless indicated above.

## 2024-01-07 NOTE — ED PROVIDER NOTE - OBJECTIVE STATEMENT
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  Patient reports developing generalized/constant headache, generalized fatigue, runny nose, and productive cough with no discolored sputum.  Has been using Tylenol X as needed, accompanying symptoms include reduced appetite.  Today patient had progressive symptoms decided to present to the ED for further evaluation. Reports contact with sick individual at home following which symptoms began.  Denies visual blurring, facial asymmetry/numbness, sore throat, swallowing difficulty, shortness of breath, chest pain, palpitations, nausea/vomiting, abdominal distention/pain, urinary complaints, constipation, arm/leg numbness/weakness.

## 2024-01-07 NOTE — ED ADULT NURSE NOTE - OBJECTIVE STATEMENT
PT is a 73 y.o female co cold symptoms. PT is A&Ox3 resting in stretcher. PT endorses fatigue, headache, rhinorrhea and cough x1 week that has worsened today. PT is a 73 y.o female co cold symptoms. PT is A&Ox3 resting in stretcher. PT endorses fatigue, headache, rhinorrhea and cough x2 weeks that has worsened today. As per pt, her sx started on 12/25/2023, resolved spontaneously, and resolved. PT states she has clear nasal discharge, intermittent cough, and generalized weakness. PT has an appetite able to tolerate po. Spontaneously breathing on RA>95%, skin dry and intact, peripheral pulses present. PT denies any sick contacts, CP, SOB, fever, N/V/D, change in mental status, or dysuria. Safety and comfort measures in place bed in lowest position, siderails upright and pt made aware to notify RN with any concern.

## 2024-01-07 NOTE — ED ADULT NURSE NOTE - NSFALLUNIVINTERV_ED_ALL_ED
Bed/Stretcher in lowest position, wheels locked, appropriate side rails in place/Call bell, personal items and telephone in reach/Instruct patient to call for assistance before getting out of bed/chair/stretcher/Non-slip footwear applied when patient is off stretcher/[place] to call system/Physically safe environment - no spills, clutter or unnecessary equipment/Purposeful proactive rounding/Room/bathroom lighting operational, light cord in reach Bed/Stretcher in lowest position, wheels locked, appropriate side rails in place/Call bell, personal items and telephone in reach/Instruct patient to call for assistance before getting out of bed/chair/stretcher/Non-slip footwear applied when patient is off stretcher/[place] to call system/Physically safe environment - no spills, clutter or unnecessary equipment/Purposeful proactive rounding/Room/bathroom lighting operational, light cord in reach

## 2024-01-07 NOTE — ED PROVIDER NOTE - NSFOLLOWUPINSTRUCTIONS_ED_ALL_ED_FT
your health care provider may prescribe two medicines to help your immune system protect you. These are called long-acting monoclonal antibodies. They are given together every 6 months.    How is this prevented?  To protect yourself:    Get the vaccine or vaccine series if you meet the guidelines. You can even get the vaccine while you are pregnant or making breast milk (lactating).  Get an added dose of the vaccine if you are immunocompromised. This applies if you have had an organ transplant or if you have a condition that affects your immune system.  You should get the added dose 4 weeks after you got the first one.  If you get an mRNA vaccine, you will need to get 3 doses.  Talk to your provider about getting experimental monoclonal antibodies. This treatment can help prevent severe illness. It may be given to you if:  You are immunocompromised.  You cannot get the vaccine. You may not get the vaccine if you have a severe allergic reaction to it or to what it is made of.  You are not fully vaccinated.  You are in a place where there is COVID-19 and:  You are in close contact with someone who has COVID-19.  You are at high risk of being exposed.  You are at risk of illness from new variants of the virus.  To protect others:    If you have symptoms of COVID-19, take steps to stop the virus from spreading.  Stay home. Leave your house only to get medical care. Do not use public transit.  Do not travel while you are sick.  Wash your hands often with soap and water for at least 20 seconds. If soap and water are not available, use alcohol-based hand .  Make sure that all people in your household wash their hands well and often.  Cough or sneeze into a tissue or your sleeve or elbow. Do not cough or sneeze into your hand or into the air.  Where to find more information  Centers for Disease Control and Prevention (CDC): cdc.gov  World Health Organization (WHO): who.int  Get help right away if:  You have trouble breathing.  You have pain or pressure in your chest.  You are confused.  Your lips or fingernails turn blue.  You have trouble waking from sleep.  Your symptoms get worse.  These symptoms may be an emergency. Get help right away. Call 911.  Do not wait to see if the symptoms will go away.  Do not drive yourself to the hospital.  This information is not intended to replace advice given to you by your health care provider. Make sure you discuss any questions you have with your health care provider.

## 2024-01-07 NOTE — ED PROVIDER NOTE - PATIENT PORTAL LINK FT
You can access the FollowMyHealth Patient Portal offered by [facility] by registering at the following website: [email or web address]. By joining [employer] [facility]’s FollowMyHealth portal, you will also be able to view your health information using other applications (apps) compatible with our system. You can access the FollowMyHealth Patient Portal offered by [facility] by registering at the following website: [email or web address]. By joining [facility]’s FollowMyHealth portal, you will also be able to view your health information using other applications (apps) compatible with our system.

## 2024-01-07 NOTE — ED PROVIDER NOTE - ATTENDING CONTRIBUTION TO CARE
uri / mild ha / wet cough / fatigue  clear lungs   rrr  non-focal neuro exam  xr chest /  cmp / cbc / flu swab

## 2024-01-07 NOTE — ED PROVIDER NOTE - CLINICAL SUMMARY MEDICAL DECISION MAKING FREE TEXT BOX
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 HD stable. Concern for URTI. Will send labs to evaluate for electrolyte imbalance, a Flu-panel, CXR to r/o PNA. Will observe for now.

## 2024-01-07 NOTE — ED PROVIDER NOTE - PROGRESS NOTE DETAILS
Malcolm DAMON: Patient re-evaluated and feeling improved.  Lab and radiology tests reviewed with patient.  Patient stable for discharge. Will follow up with PCP in 5-7 days. Follow up instructions given, importance of follow up emphasized, return to ED parameters reviewed and patient verbalized understanding.  All questions answered, all concerns addressed.

## 2024-03-08 NOTE — ED PROVIDER NOTE - IV ALTEPLASE DOOR HIDDEN
3/8/2024    Joanne Harris    Chief Complaint   Patient presents with    3 Month Follow-Up    New Patient     New to provider- Pt reports has rheumatologist appt on 03/26        HPI  History was obtained from pt.  Joanne is a 45 y.o. female with a PMHx of       Chest pain    Acquired hypothyroidism    abnormal chest CT 2013    Granulomatous lung disease (HCC)    Cervical radicular pain    Acute nonintractable headache    Family history of colon cancer    Multinodular goiter    Multiple joint pain    Elevated rheumatoid factor    Vitamin D deficiency    Rheumatoid arthritis involving multiple sites with positive rheumatoid factor (HCC)    Other constipation    BMI 39.0-39.9,adult      who presents today to establish care.     Chest pain    Acquired hypothyroidism - 274 mcg daily    abnormal chest CT 2013    Granulomatous lung disease (HCC) - multiple lung nodules, calc and non calc - will be getting glumng MRI and thyroid US this summer thru endo    Cervical radicular pain    Acute nonintractable headache    Family history of colon cancer    Multinodular goiter    Multiple joint pain    Elevated rheumatoid factor    Vitamin D deficiency    Rheumatoid arthritis involving multiple sites with positive rheumatoid factor (HCC) - currently on prednisone as need for the last year    Other constipation    BMI 39.0-39.9,adult   Folic acid and vitamin d    Specialists:  Endocrine in [facility] NP  Going [facility] rheum at [facility] later this month    Acute Concerns:  Prednisone makes her gain weight and the thyroid keeps it on her. She exercises to maintain mobility and eats a 1200 calorie diet, high fluids.    none    Family History:  Heart disease, lung cancer, RA, hashimotos  Colon cancer history in family    Recent Labs:  Low vitamin D, low tsh, t4 normal    Care Gaps:  Needs colon cancer screening    1. Encounter to establish care with new doctor    2. Colon cancer screening    3. Granulomatous lung disease (HCC)    4. Acquired  show

## 2024-03-20 NOTE — ED PROCEDURE NOTE - PROCEDURE ADDITIONAL DETAILS
Caller: Sabrina Blackmon    Relationship: Self    Best call back number: [phone]    What test was performed: BREAST BIOPSY     When was the test performed: 03/18/2024    Where was the test performed: HOSPITAL     Additional notes: PATIENT STATES SHE WAS ADVISED TO CALL TODAY TO GET THE RESULTS.     PATIENT IS REQUESTING A CALL BACK.      A hard sole shoe applied. + Ambulatory independently.

## 2024-05-08 ENCOUNTER — EMERGENCY (EMERGENCY)
Facility: HOSPITAL | Age: 74
LOS: 1 days | Discharge: HOME | End: 2024-05-08
Attending: EMERGENCY MEDICINE
Payer: MEDICARE

## 2024-05-08 VITALS
RESPIRATION RATE: 18 BRPM | OXYGEN SATURATION: 98 % | SYSTOLIC BLOOD PRESSURE: 141 MMHG | HEART RATE: 77 BPM | TEMPERATURE: 98 F | DIASTOLIC BLOOD PRESSURE: 78 MMHG

## 2024-05-08 VITALS
HEART RATE: 78 BPM | RESPIRATION RATE: 20 BRPM | TEMPERATURE: 98 F | SYSTOLIC BLOOD PRESSURE: 156 MMHG | WEIGHT: 160.06 LBS | OXYGEN SATURATION: 96 % | DIASTOLIC BLOOD PRESSURE: 82 MMHG

## 2024-05-08 DIAGNOSIS — C49.9 MALIGNANT NEOPLASM OF CONNECTIVE AND SOFT TISSUE, UNSPECIFIED: Chronic | ICD-10-CM

## 2024-05-08 PROCEDURE — 99284 EMERGENCY DEPT VISIT MOD MDM: CPT | Mod: 25

## 2024-05-08 PROCEDURE — 73110 X-RAY EXAM OF WRIST: CPT | Mod: 26,RT

## 2024-05-08 PROCEDURE — 99284 EMERGENCY DEPT VISIT MOD MDM: CPT | Mod: FS,25

## 2024-05-08 PROCEDURE — 29125 APPL SHORT ARM SPLINT STATIC: CPT

## 2024-05-08 PROCEDURE — 29125 APPL SHORT ARM SPLINT STATIC: CPT | Mod: RT

## 2024-05-08 PROCEDURE — 73110 X-RAY EXAM OF WRIST: CPT

## 2024-05-08 PROCEDURE — 73130 X-RAY EXAM OF HAND: CPT | Mod: 26,RT

## 2024-05-08 PROCEDURE — 73130 X-RAY EXAM OF HAND: CPT

## 2024-05-08 RX ORDER — ACETAMINOPHEN 500 MG
975 TABLET ORAL ONCE
Refills: 0 | Status: COMPLETED | OUTPATIENT
Start: 2024-05-08 | End: 2024-05-08

## 2024-05-08 RX ORDER — IBUPROFEN 200 MG
400 TABLET ORAL ONCE
Refills: 0 | Status: COMPLETED | OUTPATIENT
Start: 2024-05-08 | End: 2024-05-08

## 2024-05-08 RX ADMIN — Medication 400 MILLIGRAM(S): at 13:35

## 2024-05-08 RX ADMIN — Medication 975 MILLIGRAM(S): at 13:35

## 2024-05-08 NOTE — ED PROVIDER NOTE - PATIENT PORTAL LINK FT
You can access the FollowMyHealth Patient Portal offered by [facility] by registering at the following website: [email or web address]. By joining [employer]’s FollowMyHealth portal, you will also be able to view your health information using other applications (apps) compatible with our system.

## 2024-05-08 NOTE — ED PROVIDER NOTE - CARE PROVIDER_API CALL
Adama Ordoñez.  Plastic Surgery  [address] [place] [id number]  Phone: [phone]  Fax: [phone]  Follow Up Time: 7-10 Days   Adama Ordoñez  Plastic Surgery  [address] [place] [address]  Phone: [phone]  Fax: [phone]  Follow Up Time: 7-10 Days    Shilpa Caballero  Surgery of the Hand  [address], [address]  [place] [address]  Phone: [phone]  Fax: [phone]  Follow Up Time: 7-10 Days

## 2024-05-08 NOTE — ED PROVIDER NOTE - CARE PROVIDERS DIRECT ADDRESSES
,DirectAddress_Unknown ,DirectAddress_Unknown,[email or web address]

## 2024-05-08 NOTE — ED PROVIDER NOTE - CLINICAL SUMMARY MEDICAL DECISION MAKING FREE TEXT BOX
Attending note.  Atraumatic right thumb pain with tenderness and swelling at the basilar joint.  X-ray to evaluate for fracture and osteoarthritis. Attending note.  Atraumatic right thumb pain with tenderness and swelling at the basilar joint.  X-ray to evaluate for fracture and osteoarthritis.      As per H&P, differential diagnosis has been limited to arthritis, possible gouty arthritis however no significant warmth on exam, less likely fracture given no preceding trauma including strain or sprain possible de Quervain's tenosynovitis.  H&P not c/w septic joint. Plan for x-ray, pain control, reassess.  Nonactionable x-ray, splint and discharged with orthopedic follow-up. -Willy Vallejo PA-C

## 2024-05-08 NOTE — ED ADULT TRIAGE NOTE - SPO2 (%)
89 Patient found supine in bed with +IV lock, +Q pain pump/+bandage right LE. Patient chart reviewed, events noted. Patient cleared to be seen for therapy by RN prior to session.

## 2024-05-08 NOTE — ED PROVIDER NOTE - NSFOLLOWUPINSTRUCTIONS_ED_ALL_ED_FT
1) Follow-up with your PCP in 2-3 days.  Follow-up with a hand specialist in 7 days. Call today or tomorrow to make an appointment.    Adama Ordoñez.  Plastic Surgery  [address] [place] [id number]  Phone: [phone]  Fax: [phone]    Bring all printed results to discuss with your provider.    2) Keep splint clean, dry, and intact until you are seen by orthopedics.    Rest the affected area as much as possible. Elevate the area, use ice to reduce swelling and pain.    3) Pain can be managed with Tylenol and Ibuprofen over the counter as directed.    Continue to take all other medications as directed.    4) Return to the emergency room immediately if your symptoms worsen or persist, or if you have a high or concerning fever, new or worsening pain in the affected area, numbness or tingling of the affected area, inability to move the affected area, increased swelling, redness of affected area, or if any other concerning or questionable symptoms.     Please read all attached patient information.

## 2024-05-08 NOTE — ED PROVIDER NOTE - OBJECTIVE STATEMENT
Attending note.  Patient was seen in [facility].  Patient complains of atraumatic right dominant thumb pain which began this morning.  Is worse with movement.  She denies any trauma, injury, repetitive use.  She reports also some right shoulder pain.  Patient took Tylenol this morning.  She denies any previous episodes.  Aside from her shoulder pain she denies any other joint pains.  Patient reports swelling in the joint and decreased range of motion this morning.  It is improved. Attending note.  Patient was seen in [facility].  Patient complains of atraumatic right dominant thumb pain which began this morning.  Is worse with movement.  She denies any trauma, injury, repetitive use.  She reports also some right shoulder pain.  Patient took Tylenol this morning.  She denies any previous episodes.  Aside from her shoulder pain she denies any other joint pains.  Patient reports swelling in the joint and decreased range of motion this morning.  It is improved.    -Willy Vallejo PA-C: 74-year-old female PMH of HTN and anxiety, right-hand-dominant presents to the ED complaining of atraumatic pain to the base of the right thumb since this morning after she woke up.  Patient is retired, reports she did not do a job that requires repetitive thumb movements and does not use her thumb regularly to school on her phone or do any other repetitive daily activities.  Patient denies a history of gout, fevers, chills, recent trauma to the area, numbness, paresthesias, weakness, redness or rashes.  Patient attempting to 400 mg of ibuprofen this morning at 8:30 AM and running the hand under warm water which helped mildly improve the range of motion of the area but patient still with persistent symptoms.

## 2024-05-08 NOTE — ED PROVIDER NOTE - PHYSICAL EXAMINATION
Attending note.  Patient is alert and in no acute distress.  Examination of the right upper extremity reveals some slight swelling at the basilar joint of the right thumb.  There is slight increased heat in the area.  She has pain with palpation and pain with active range of motion of the thumb.  She has full range of motion.  Wrist is nontender.  Other digits are nontender. Attending note.  Patient is alert and in no acute distress.  Examination of the right upper extremity reveals some slight swelling at the basilar joint of the right thumb.  There is slight increased heat in the area.  She has pain with palpation and pain with active range of motion of the thumb.  She has full range of motion.  Wrist is nontender.  Other digits are nontender.    -Willy Vallejo PA-C GEN: Pt in NAD, A&O x3.  PSYCH: Affect appropriate.  EYES: Sclera white w/o injection.   ENT: Head NCAT. Neck supple FROM.  RESP: No evidence of respiratory distress.  CARDIAC: RRR  VASC: 2+ radial and ulnar pulses b/l. No edema.  MSK: No joint erythema or obvious deformity. No joint warmth. +tenderness at the base of the right 1st metacarpal, midl ttp of thenar eminance without overlying skin change, warmth, induration, fluctuance. No other bony ttp b/l UE. Limited opposition and flexion of right 1st digit due to pain, otherwise FROM b/l UE. +finkelstein R wrist.  NEURO: Normal and equal sensation and 5/5 strength b/l UE.  SKIN: No rashes noted.

## 2024-05-08 NOTE — ED PROVIDER NOTE - SHIFT CHANGE DETAILS
Attending MD Rizzo: 74F w atraumatic L thumb pain, pending splint, suspect arthritic, then TBD wtih rheum/hand

## 2024-05-08 NOTE — ED PROVIDER NOTE - PROVIDER TOKENS
PROVIDER:[TOKEN:[[id number]],FOLLOWUP:[7-10 Days]] PROVIDER:[TOKEN:[[id number]],FOLLOWUP:[7-10 Days]],PROVIDER:[TOKEN:[[id number]],FOLLOWUP:[7-10 Days]]

## 2024-05-08 NOTE — ED PROVIDER NOTE - PROGRESS NOTE DETAILS
X-ray without any acute findings, patient placed in thumb spica splint for comfort.  Will discharge patient with outpatient orthopedic/hand follow-up.  All questions answered.  Patient ready stable for discharge

## 2024-05-16 ENCOUNTER — APPOINTMENT (OUTPATIENT)
Dept: ORTHOPEDIC SURGERY | Facility: CLINIC | Age: 74
End: 2024-05-16
Payer: MEDICARE

## 2024-05-16 VITALS
HEART RATE: 85 BPM | BODY MASS INDEX: 28.71 KG/M2 | HEIGHT: 62 IN | SYSTOLIC BLOOD PRESSURE: 139 MMHG | DIASTOLIC BLOOD PRESSURE: 79 MMHG | WEIGHT: 156 LBS

## 2024-05-16 DIAGNOSIS — M18.11 UNILATERAL PRIMARY OSTEOARTHRITIS OF FIRST CARPOMETACARPAL JOINT, RIGHT HAND: ICD-10-CM

## 2024-05-16 PROCEDURE — 99203 OFFICE O/P NEW LOW 30 MIN: CPT

## 2024-05-16 RX ORDER — MELOXICAM 15 MG/1
15 TABLET ORAL DAILY
Qty: 20 | Refills: 0 | Status: ACTIVE | COMMUNITY
Start: 2024-05-16 | End: 1900-01-01

## 2024-05-16 NOTE — HISTORY OF PRESENT ILLNESS
[Right] : right hand dominant [FreeTextEntry1] : She comes in today for evaluation of right thumb pain that started on 5/9 with no known injury. She is having radiating pain into her wrist as well as swelling and occasional locking. She denies any numbness, tingling, or locking. She has been wearing a thumb spica splint.   She also complains of radiating pain down her arm from her shoulder after she hit it against a door. Her pain is worse with activity.

## 2024-05-16 NOTE — ADDENDUM
[FreeTextEntry1] : I, Taco Wheeler, acted solely as a scribe for Dr. Meléndez on this date on 5/16/24.

## 2024-05-16 NOTE — PHYSICAL EXAM
[de-identified] :  - Constitutional: This is a female in no obvious distress.   - Psych: Patient is alert and oriented to person, place and time.  Patient has a normal mood and affect. - Cardiovascular: Normal pulses throughout the upper extremities.  No significant varicosities are noted in the upper extremities.  - Neuro: Strength and sensation are intact throughout the upper extremities.  Patient has normal coordination. - Respiratory:  Patient exhibits no evidence of shortness of breath or difficulty breathing. - Skin: No rashes, lesions, or other abnormalities are noted in the upper extremities. ---  Examination of her right thumb demonstrates no obvious swelling.  There is mild tenderness along the CMC joint.  There is no swelling or tenderness on the first dorsal compartment and there is a negative Finkelstein sign.  There is no swelling or tenderness A1 pulley of the thumb or evidence of a trigger thumb.  Lamination of the right shoulder demonstrates no obvious swelling.  She has a mildly positive Neer and Gusman sign.  There is negative drop arm test. 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

## 2024-05-16 NOTE — DISCUSSION/SUMMARY
[FreeTextEntry1] : She has findings consistent with moderate right thumb CMC joint arthritis, with symptoms that began 1 week ago.  She also has mild right shoulder pain after hitting her arm on a door.   I had a discussion with the patient regarding today's visit, the prognosis of this diagnosis, and treatment recommendations and options. At this time, I recommended that she continue wearing her thumb spica splint and that she begin a course of Mobic 15 mg once daily with meals. I warned about potential GI side effects.  With regard to her right thumb, if her symptoms become more problematic, then she will follow-up for a cortisone injection suture.  With regard to her right shoulder, if her symptoms persist, then she will see one of my partners who specializes in shoulders.  The patient has agreed to the above plan of management and has expressed full understanding.  All questions were fully answered to the patient's satisfaction.   My cumulative time spent on this visit included: Preparation for the visit, review of the medical records, review of pertinent diagnostic studies, examination and counseling of the patient on the above diagnosis, treatment plan and prognosis, orders of diagnostic tests, medication and/or appropriate procedures and documentation in the medical records of today's visit.

## 2024-05-16 NOTE — END OF VISIT
[FreeTextEntry3] : This note was written by Taco Wheeler on 5/16/24 acting solely as a scribe for Dr. Maurizio Meléndez.   All medical record entries made by the Scribe were at my, Dr. Maurizio Meléndez, direction and personally dictated by me on 5/16/24. I have personally reviewed the chart and agree that the record accurately reflects my personal performance of the history, physical exam, assessment and plan.

## 2024-09-09 ENCOUNTER — RESULT REVIEW (OUTPATIENT)
Age: 74
End: 2024-09-09

## 2024-09-09 ENCOUNTER — APPOINTMENT (OUTPATIENT)
Dept: MAMMOGRAPHY | Facility: IMAGING CENTER | Age: 74
End: 2024-09-09
Payer: MEDICARE

## 2024-09-09 ENCOUNTER — APPOINTMENT (OUTPATIENT)
Dept: ULTRASOUND IMAGING | Facility: IMAGING CENTER | Age: 74
End: 2024-09-09
Payer: MEDICARE

## 2024-09-09 ENCOUNTER — OUTPATIENT (OUTPATIENT)
Dept: OUTPATIENT SERVICES | Facility: HOSPITAL | Age: 74
LOS: 1 days | End: 2024-09-09
Payer: MEDICARE

## 2024-09-09 DIAGNOSIS — R92.30 DENSE BREASTS, UNSPECIFIED: ICD-10-CM

## 2024-09-09 DIAGNOSIS — Z85.9 PERSONAL HISTORY OF MALIGNANT NEOPLASM, UNSPECIFIED: ICD-10-CM

## 2024-09-09 DIAGNOSIS — C49.9 MALIGNANT NEOPLASM OF CONNECTIVE AND SOFT TISSUE, UNSPECIFIED: Chronic | ICD-10-CM

## 2024-09-09 PROCEDURE — 76641 ULTRASOUND BREAST COMPLETE: CPT | Mod: 26,50,GY

## 2024-09-09 PROCEDURE — 77063 BREAST TOMOSYNTHESIS BI: CPT

## 2024-09-09 PROCEDURE — 76641 ULTRASOUND BREAST COMPLETE: CPT

## 2024-09-09 PROCEDURE — 77063 BREAST TOMOSYNTHESIS BI: CPT | Mod: 26

## 2024-09-09 PROCEDURE — 77067 SCR MAMMO BI INCL CAD: CPT

## 2024-09-09 PROCEDURE — 77067 SCR MAMMO BI INCL CAD: CPT | Mod: 26

## 2024-09-14 DIAGNOSIS — N63.10 UNSPECIFIED LUMP IN THE RIGHT BREAST, UNSPECIFIED QUADRANT: ICD-10-CM

## 2024-09-24 ENCOUNTER — OUTPATIENT (OUTPATIENT)
Dept: OUTPATIENT SERVICES | Facility: HOSPITAL | Age: 74
LOS: 1 days | End: 2024-09-24
Payer: MEDICARE

## 2024-09-24 ENCOUNTER — APPOINTMENT (OUTPATIENT)
Dept: MAMMOGRAPHY | Facility: IMAGING CENTER | Age: 74
End: 2024-09-24
Payer: MEDICARE

## 2024-09-24 ENCOUNTER — RESULT REVIEW (OUTPATIENT)
Age: 74
End: 2024-09-24

## 2024-09-24 ENCOUNTER — APPOINTMENT (OUTPATIENT)
Dept: ULTRASOUND IMAGING | Facility: IMAGING CENTER | Age: 74
End: 2024-09-24
Payer: MEDICARE

## 2024-09-24 DIAGNOSIS — C49.9 MALIGNANT NEOPLASM OF CONNECTIVE AND SOFT TISSUE, UNSPECIFIED: Chronic | ICD-10-CM

## 2024-09-24 DIAGNOSIS — N63.10 UNSPECIFIED LUMP IN THE RIGHT BREAST, UNSPECIFIED QUADRANT: ICD-10-CM

## 2024-09-24 PROCEDURE — G0279: CPT | Mod: 26

## 2024-09-24 PROCEDURE — 77065 DX MAMMO INCL CAD UNI: CPT

## 2024-09-24 PROCEDURE — 77065 DX MAMMO INCL CAD UNI: CPT | Mod: 26,RT

## 2024-09-24 PROCEDURE — 76642 ULTRASOUND BREAST LIMITED: CPT | Mod: 26,RT

## 2024-09-24 PROCEDURE — 76642 ULTRASOUND BREAST LIMITED: CPT

## 2024-09-24 PROCEDURE — G0279: CPT

## 2024-09-26 DIAGNOSIS — R92.8 OTHER ABNORMAL AND INCONCLUSIVE FINDINGS ON DIAGNOSTIC IMAGING OF BREAST: ICD-10-CM

## 2024-09-27 ENCOUNTER — RESULT REVIEW (OUTPATIENT)
Age: 74
End: 2024-09-27

## 2024-09-27 ENCOUNTER — APPOINTMENT (OUTPATIENT)
Dept: ULTRASOUND IMAGING | Facility: CLINIC | Age: 74
End: 2024-09-27
Payer: MEDICARE

## 2024-09-27 ENCOUNTER — OUTPATIENT (OUTPATIENT)
Dept: OUTPATIENT SERVICES | Facility: HOSPITAL | Age: 74
LOS: 1 days | End: 2024-09-27
Payer: MEDICARE

## 2024-09-27 DIAGNOSIS — C49.9 MALIGNANT NEOPLASM OF CONNECTIVE AND SOFT TISSUE, UNSPECIFIED: Chronic | ICD-10-CM

## 2024-09-27 DIAGNOSIS — R92.8 OTHER ABNORMAL AND INCONCLUSIVE FINDINGS ON DIAGNOSTIC IMAGING OF BREAST: ICD-10-CM

## 2024-09-27 PROCEDURE — 88360 TUMOR IMMUNOHISTOCHEM/MANUAL: CPT

## 2024-09-27 PROCEDURE — 77065 DX MAMMO INCL CAD UNI: CPT | Mod: 26,RT

## 2024-09-27 PROCEDURE — 88305 TISSUE EXAM BY PATHOLOGIST: CPT | Mod: 26

## 2024-09-27 PROCEDURE — 19083 BX BREAST 1ST LESION US IMAG: CPT | Mod: RT

## 2024-09-27 PROCEDURE — 88305 TISSUE EXAM BY PATHOLOGIST: CPT

## 2024-09-27 PROCEDURE — 88360 TUMOR IMMUNOHISTOCHEM/MANUAL: CPT | Mod: 26

## 2024-09-27 PROCEDURE — 19083 BX BREAST 1ST LESION US IMAG: CPT

## 2024-09-27 PROCEDURE — A4648: CPT

## 2024-09-27 PROCEDURE — 77065 DX MAMMO INCL CAD UNI: CPT

## 2024-10-02 ENCOUNTER — NON-APPOINTMENT (OUTPATIENT)
Age: 74
End: 2024-10-02

## 2024-10-02 DIAGNOSIS — D05.11 INTRADUCTAL CARCINOMA IN SITU OF RIGHT BREAST: ICD-10-CM

## 2024-10-23 ENCOUNTER — RESULT REVIEW (OUTPATIENT)
Age: 74
End: 2024-10-23

## 2024-10-23 ENCOUNTER — OUTPATIENT (OUTPATIENT)
Dept: OUTPATIENT SERVICES | Facility: HOSPITAL | Age: 74
LOS: 1 days | End: 2024-10-23
Payer: MEDICARE

## 2024-10-23 ENCOUNTER — APPOINTMENT (OUTPATIENT)
Dept: MRI IMAGING | Facility: IMAGING CENTER | Age: 74
End: 2024-10-23
Payer: MEDICARE

## 2024-10-23 DIAGNOSIS — C49.9 MALIGNANT NEOPLASM OF CONNECTIVE AND SOFT TISSUE, UNSPECIFIED: Chronic | ICD-10-CM

## 2024-10-23 DIAGNOSIS — D05.11 INTRADUCTAL CARCINOMA IN SITU OF RIGHT BREAST: ICD-10-CM

## 2024-10-23 PROCEDURE — 77049 MRI BREAST C-+ W/CAD BI: CPT | Mod: 26

## 2024-10-25 ENCOUNTER — NON-APPOINTMENT (OUTPATIENT)
Age: 74
End: 2024-10-25

## 2024-10-28 PROBLEM — Z80.3 FAMILY HISTORY OF BREAST CANCER: Status: ACTIVE | Noted: 2024-10-28

## 2024-10-29 ENCOUNTER — NON-APPOINTMENT (OUTPATIENT)
Age: 74
End: 2024-10-29

## 2024-10-29 ENCOUNTER — RESULT REVIEW (OUTPATIENT)
Age: 74
End: 2024-10-29

## 2024-10-29 ENCOUNTER — APPOINTMENT (OUTPATIENT)
Dept: PLASTIC SURGERY | Facility: CLINIC | Age: 74
End: 2024-10-29
Payer: MEDICARE

## 2024-10-29 VITALS
HEART RATE: 88 BPM | OXYGEN SATURATION: 99 % | BODY MASS INDEX: 27.79 KG/M2 | DIASTOLIC BLOOD PRESSURE: 79 MMHG | WEIGHT: 151 LBS | SYSTOLIC BLOOD PRESSURE: 159 MMHG | TEMPERATURE: 98 F | HEIGHT: 62 IN

## 2024-10-29 DIAGNOSIS — Z80.3 FAMILY HISTORY OF MALIGNANT NEOPLASM OF BREAST: ICD-10-CM

## 2024-10-29 PROCEDURE — 99205 OFFICE O/P NEW HI 60 MIN: CPT

## 2024-10-30 ENCOUNTER — NON-APPOINTMENT (OUTPATIENT)
Age: 74
End: 2024-10-30

## 2024-10-30 LAB
ALBUMIN SERPL ELPH-MCNC: 4.1 G/DL
ALP BLD-CCNC: 62 U/L
ALT SERPL-CCNC: 15 U/L
ANION GAP SERPL CALC-SCNC: 14 MMOL/L
AST SERPL-CCNC: 25 U/L
BILIRUB SERPL-MCNC: 0.8 MG/DL
BUN SERPL-MCNC: 20 MG/DL
CALCIUM SERPL-MCNC: 9.8 MG/DL
CHLORIDE SERPL-SCNC: 100 MMOL/L
CO2 SERPL-SCNC: 29 MMOL/L
CREAT SERPL-MCNC: 0.58 MG/DL
EGFR: 95 ML/MIN/1.73M2
GLUCOSE SERPL-MCNC: 59 MG/DL
POTASSIUM SERPL-SCNC: 3.5 MMOL/L
PROT SERPL-MCNC: 7.7 G/DL
SODIUM SERPL-SCNC: 144 MMOL/L

## 2024-10-31 ENCOUNTER — OUTPATIENT (OUTPATIENT)
Dept: OUTPATIENT SERVICES | Facility: HOSPITAL | Age: 74
LOS: 1 days | Discharge: HOME | End: 2024-10-31

## 2024-10-31 DIAGNOSIS — Z12.31 ENCOUNTER FOR SCREENING MAMMOGRAM FOR MALIGNANT NEOPLASM OF BREAST: ICD-10-CM

## 2024-10-31 DIAGNOSIS — C49.9 MALIGNANT NEOPLASM OF CONNECTIVE AND SOFT TISSUE, UNSPECIFIED: Chronic | ICD-10-CM

## 2024-10-31 PROBLEM — Z63.5 DIVORCED: Status: ACTIVE | Noted: 2024-10-31

## 2024-11-01 ENCOUNTER — RESULT REVIEW (OUTPATIENT)
Age: 74
End: 2024-11-01

## 2024-11-01 ENCOUNTER — APPOINTMENT (OUTPATIENT)
Dept: HEMATOLOGY ONCOLOGY | Facility: CLINIC | Age: 74
End: 2024-11-01
Payer: MEDICARE

## 2024-11-01 ENCOUNTER — NON-APPOINTMENT (OUTPATIENT)
Age: 74
End: 2024-11-01

## 2024-11-01 VITALS
RESPIRATION RATE: 15 BRPM | HEIGHT: 60.24 IN | BODY MASS INDEX: 29.48 KG/M2 | DIASTOLIC BLOOD PRESSURE: 85 MMHG | WEIGHT: 152.12 LBS | SYSTOLIC BLOOD PRESSURE: 134 MMHG | HEART RATE: 91 BPM | OXYGEN SATURATION: 95 % | TEMPERATURE: 99.3 F

## 2024-11-01 DIAGNOSIS — Z17.1 MALIGNANT NEOPLASM OF LOWER-OUTER QUADRANT OF RIGHT FEMALE BREAST: ICD-10-CM

## 2024-11-01 DIAGNOSIS — C50.511 MALIGNANT NEOPLASM OF LOWER-OUTER QUADRANT OF RIGHT FEMALE BREAST: ICD-10-CM

## 2024-11-01 DIAGNOSIS — G62.9 POLYNEUROPATHY, UNSPECIFIED: ICD-10-CM

## 2024-11-01 DIAGNOSIS — Z63.5 DISRUPTION OF FAMILY BY SEPARATION AND DIVORCE: ICD-10-CM

## 2024-11-01 DIAGNOSIS — S32.029D: ICD-10-CM

## 2024-11-01 DIAGNOSIS — I10 ESSENTIAL (PRIMARY) HYPERTENSION: ICD-10-CM

## 2024-11-01 LAB
ALBUMIN SERPL ELPH-MCNC: 4.4 G/DL
ALP BLD-CCNC: 64 U/L
ALT SERPL-CCNC: 17 U/L
ANION GAP SERPL CALC-SCNC: 13 MMOL/L
AST SERPL-CCNC: 29 U/L
BASOPHILS # BLD AUTO: 0.05 K/UL — SIGNIFICANT CHANGE UP (ref 0–0.2)
BASOPHILS NFR BLD AUTO: 1.3 % — SIGNIFICANT CHANGE UP (ref 0–2)
BILIRUB SERPL-MCNC: 0.6 MG/DL
BUN SERPL-MCNC: 15 MG/DL
CALCIUM SERPL-MCNC: 9.9 MG/DL
CHLORIDE SERPL-SCNC: 100 MMOL/L
CO2 SERPL-SCNC: 28 MMOL/L
CREAT SERPL-MCNC: 0.55 MG/DL
EGFR: 96 ML/MIN/1.73M2
EOSINOPHIL # BLD AUTO: 0.04 K/UL — SIGNIFICANT CHANGE UP (ref 0–0.5)
EOSINOPHIL NFR BLD AUTO: 1.1 % — SIGNIFICANT CHANGE UP (ref 0–6)
GLUCOSE SERPL-MCNC: 94 MG/DL
HCT VFR BLD CALC: 41.3 % — SIGNIFICANT CHANGE UP (ref 34.5–45)
HGB BLD-MCNC: 13 G/DL — SIGNIFICANT CHANGE UP (ref 11.5–15.5)
IMM GRANULOCYTES NFR BLD AUTO: 0 % — SIGNIFICANT CHANGE UP (ref 0–0.9)
INR PPP: 0.99 RATIO
LYMPHOCYTES # BLD AUTO: 2.02 K/UL — SIGNIFICANT CHANGE UP (ref 1–3.3)
LYMPHOCYTES # BLD AUTO: 53.7 % — HIGH (ref 13–44)
MCHC RBC-ENTMCNC: 29.6 PG — SIGNIFICANT CHANGE UP (ref 27–34)
MCHC RBC-ENTMCNC: 31.5 G/DL — LOW (ref 32–36)
MCV RBC AUTO: 94.1 FL — SIGNIFICANT CHANGE UP (ref 80–100)
MONOCYTES # BLD AUTO: 0.32 K/UL — SIGNIFICANT CHANGE UP (ref 0–0.9)
MONOCYTES NFR BLD AUTO: 8.5 % — SIGNIFICANT CHANGE UP (ref 2–14)
NEUTROPHILS # BLD AUTO: 1.33 K/UL — LOW (ref 1.8–7.4)
NEUTROPHILS NFR BLD AUTO: 35.4 % — LOW (ref 43–77)
NRBC # BLD: 0 /100 WBCS — SIGNIFICANT CHANGE UP (ref 0–0)
NRBC BLD-RTO: 0 /100 WBCS — SIGNIFICANT CHANGE UP (ref 0–0)
PLATELET # BLD AUTO: 189 K/UL — SIGNIFICANT CHANGE UP (ref 150–400)
POTASSIUM SERPL-SCNC: 4 MMOL/L
PROT SERPL-MCNC: 7.9 G/DL
PT BLD: 11.7 SEC
RBC # BLD: 4.39 M/UL — SIGNIFICANT CHANGE UP (ref 3.8–5.2)
RBC # FLD: 12.9 % — SIGNIFICANT CHANGE UP (ref 10.3–14.5)
SODIUM SERPL-SCNC: 142 MMOL/L
WBC # BLD: 3.76 K/UL — LOW (ref 3.8–10.5)
WBC # FLD AUTO: 3.76 K/UL — LOW (ref 3.8–10.5)

## 2024-11-01 PROCEDURE — G2211 COMPLEX E/M VISIT ADD ON: CPT

## 2024-11-01 PROCEDURE — 99205 OFFICE O/P NEW HI 60 MIN: CPT

## 2024-11-01 RX ORDER — DOXEPIN HYDROCHLORIDE 25 MG/1
25 CAPSULE ORAL
Qty: 60 | Refills: 0 | Status: ACTIVE | COMMUNITY
Start: 2023-06-23

## 2024-11-01 RX ORDER — ESCITALOPRAM OXALATE 20 MG/1
20 TABLET ORAL DAILY
Qty: 90 | Refills: 1 | Status: ACTIVE | COMMUNITY
Start: 2024-11-01

## 2024-11-01 RX ORDER — DOXEPIN HYDROCHLORIDE 10 MG/1
10 CAPSULE ORAL
Qty: 180 | Refills: 0 | Status: ACTIVE | COMMUNITY
Start: 2024-07-10

## 2024-11-01 RX ORDER — NITROFURANTOIN (MONOHYDRATE/MACROCRYSTALS) 25; 75 MG/1; MG/1
100 CAPSULE ORAL
Qty: 2 | Refills: 0 | Status: ACTIVE | COMMUNITY
Start: 2024-08-29

## 2024-11-01 RX ORDER — GABAPENTIN 300 MG/1
300 CAPSULE ORAL
Qty: 30 | Refills: 0 | Status: ACTIVE | COMMUNITY
Start: 2024-09-12

## 2024-11-01 SDOH — SOCIAL STABILITY - SOCIAL INSECURITY: DISRUPTION OF FAMILY BY SEPARATION AND DIVORCE: Z63.5

## 2024-11-04 LAB
HBV CORE IGG+IGM SER QL: NONREACTIVE
HBV SURFACE AB SER QL: NONREACTIVE
HBV SURFACE AG SER QL: NONREACTIVE
HCV AB SER QL: NONREACTIVE
HCV S/CO RATIO: 0.13 S/CO

## 2024-11-05 ENCOUNTER — APPOINTMENT (OUTPATIENT)
Dept: ULTRASOUND IMAGING | Facility: IMAGING CENTER | Age: 74
End: 2024-11-05
Payer: MEDICARE

## 2024-11-05 ENCOUNTER — RESULT REVIEW (OUTPATIENT)
Age: 74
End: 2024-11-05

## 2024-11-05 ENCOUNTER — OUTPATIENT (OUTPATIENT)
Dept: OUTPATIENT SERVICES | Facility: HOSPITAL | Age: 74
LOS: 1 days | End: 2024-11-05
Payer: MEDICARE

## 2024-11-05 ENCOUNTER — NON-APPOINTMENT (OUTPATIENT)
Age: 74
End: 2024-11-05

## 2024-11-05 ENCOUNTER — APPOINTMENT (OUTPATIENT)
Dept: RADIOLOGY | Facility: IMAGING CENTER | Age: 74
End: 2024-11-05
Payer: MEDICARE

## 2024-11-05 DIAGNOSIS — C49.9 MALIGNANT NEOPLASM OF CONNECTIVE AND SOFT TISSUE, UNSPECIFIED: Chronic | ICD-10-CM

## 2024-11-05 DIAGNOSIS — R92.8 OTHER ABNORMAL AND INCONCLUSIVE FINDINGS ON DIAGNOSTIC IMAGING OF BREAST: ICD-10-CM

## 2024-11-05 DIAGNOSIS — C50.911 MALIGNANT NEOPLASM OF UNSPECIFIED SITE OF RIGHT FEMALE BREAST: ICD-10-CM

## 2024-11-05 PROCEDURE — 76882 US LMTD JT/FCL EVL NVASC XTR: CPT | Mod: 26,RT

## 2024-11-05 PROCEDURE — 71045 X-RAY EXAM CHEST 1 VIEW: CPT | Mod: 26

## 2024-11-07 ENCOUNTER — NON-APPOINTMENT (OUTPATIENT)
Age: 74
End: 2024-11-07

## 2024-11-11 ENCOUNTER — NON-APPOINTMENT (OUTPATIENT)
Age: 74
End: 2024-11-11

## 2024-11-18 ENCOUNTER — APPOINTMENT (OUTPATIENT)
Dept: CARDIOLOGY | Facility: CLINIC | Age: 74
End: 2024-11-18

## 2024-11-20 ENCOUNTER — APPOINTMENT (OUTPATIENT)
Dept: ENDOVASCULAR SURGERY | Facility: CLINIC | Age: 74
End: 2024-11-20

## 2024-11-20 PROCEDURE — A9585: CPT

## 2024-11-20 PROCEDURE — C8937: CPT

## 2024-11-20 PROCEDURE — 71045 X-RAY EXAM CHEST 1 VIEW: CPT

## 2024-11-20 PROCEDURE — C8908: CPT

## 2024-11-20 PROCEDURE — 76882 US LMTD JT/FCL EVL NVASC XTR: CPT

## 2024-11-21 ENCOUNTER — APPOINTMENT (OUTPATIENT)
Dept: INFUSION THERAPY | Facility: HOSPITAL | Age: 74
End: 2024-11-21

## 2024-11-21 ENCOUNTER — APPOINTMENT (OUTPATIENT)
Dept: HEMATOLOGY ONCOLOGY | Facility: CLINIC | Age: 74
End: 2024-11-21

## 2024-12-12 ENCOUNTER — APPOINTMENT (OUTPATIENT)
Dept: HEMATOLOGY ONCOLOGY | Facility: CLINIC | Age: 74
End: 2024-12-12

## 2024-12-12 ENCOUNTER — APPOINTMENT (OUTPATIENT)
Dept: INFUSION THERAPY | Facility: HOSPITAL | Age: 74
End: 2024-12-12

## 2025-01-02 ENCOUNTER — APPOINTMENT (OUTPATIENT)
Dept: INFUSION THERAPY | Facility: HOSPITAL | Age: 75
End: 2025-01-02

## 2025-01-02 ENCOUNTER — APPOINTMENT (OUTPATIENT)
Dept: HEMATOLOGY ONCOLOGY | Facility: CLINIC | Age: 75
End: 2025-01-02

## 2025-01-12 PROBLEM — C50.919 DUCTAL CARCINOMA OF BREAST: Status: ACTIVE | Noted: 2025-01-12

## 2025-01-16 ENCOUNTER — NON-APPOINTMENT (OUTPATIENT)
Age: 75
End: 2025-01-16

## 2025-01-23 ENCOUNTER — APPOINTMENT (OUTPATIENT)
Dept: HEMATOLOGY ONCOLOGY | Facility: CLINIC | Age: 75
End: 2025-01-23

## 2025-01-23 ENCOUNTER — APPOINTMENT (OUTPATIENT)
Dept: INFUSION THERAPY | Facility: HOSPITAL | Age: 75
End: 2025-01-23

## 2025-02-13 ENCOUNTER — APPOINTMENT (OUTPATIENT)
Dept: HEMATOLOGY ONCOLOGY | Facility: CLINIC | Age: 75
End: 2025-02-13

## 2025-02-13 ENCOUNTER — APPOINTMENT (OUTPATIENT)
Dept: INFUSION THERAPY | Facility: HOSPITAL | Age: 75
End: 2025-02-13

## 2025-03-06 ENCOUNTER — APPOINTMENT (OUTPATIENT)
Dept: HEMATOLOGY ONCOLOGY | Facility: CLINIC | Age: 75
End: 2025-03-06

## 2025-03-06 ENCOUNTER — APPOINTMENT (OUTPATIENT)
Dept: INFUSION THERAPY | Facility: HOSPITAL | Age: 75
End: 2025-03-06

## 2025-04-16 NOTE — ED PROCEDURE NOTE - NS_EDPROVIDERDISPOUSERTYPE_ED_A_ED
What Type Of Note Output Would You Prefer (Optional)?: Standard Output Hpi Title: Evaluation of Skin Lesions How Severe Are Your Spot(S)?: moderate Have Your Spot(S) Been Treated In The Past?: has not been treated Attending Attestation (For Attendings USE Only)...

## 2025-05-07 ENCOUNTER — APPOINTMENT (OUTPATIENT)
Dept: ORTHOPEDIC SURGERY | Facility: CLINIC | Age: 75
End: 2025-05-07

## 2025-07-08 NOTE — DISCHARGE NOTE PROVIDER - PROVIDER RX CONTACT NUMBER
Below is some helpful information about your upcoming surgery.  Please arrive at [facility] ([address]) at 1100 on 07/11/2025.  Please do not eat after midnight, the night prior to your surgery.  It is ok to drink clear liquids up until 0900.  Some examples of clear liquids include: water, apple juice, jello or black coffee.   Please don’t drink anything with pulp such as, orange juice.    Please take the following medication the morning of surgery: all medications okay to take.  Please bring medications that you take in their original prescription bottles the day of surgery.  Bring any cases for your contact lens, dentures, partials or glasses.  Leave any valuables at home and remove all jewelry prior to your arrival.  Please don't wear any make-up or hair product the morning of surgery.    Bring your insurance card and a photo ID. Stop at the registration desk when you are arrive.   Make sure you have arranged for someone to bring you home.     If you were to become ill prior to your surgery, please contact your family care physician.  The quality of your stay is important to us.  We want to ensure you have excellent care during your stay.  Please don’t hesitate to call with any questions about your surgery at [phone] (hours of operation are 8am-4pm Monday-Friday).  We look forward to caring for you!    Sincerely,   The Pre-surgical Evaluation Department      [phone]

## 2025-07-17 ENCOUNTER — NON-APPOINTMENT (OUTPATIENT)
Age: 75
End: 2025-07-17

## 2025-07-17 ENCOUNTER — APPOINTMENT (OUTPATIENT)
Dept: OBGYN | Facility: CLINIC | Age: 75
End: 2025-07-17
Payer: MEDICARE

## 2025-07-17 VITALS — SYSTOLIC BLOOD PRESSURE: 149 MMHG | BODY MASS INDEX: 27.13 KG/M2 | DIASTOLIC BLOOD PRESSURE: 85 MMHG | WEIGHT: 140 LBS

## 2025-07-17 PROCEDURE — G0444 DEPRESSION SCREEN ANNUAL: CPT | Mod: 59

## 2025-07-17 PROCEDURE — G0101: CPT

## 2025-07-21 LAB — CYTOLOGY CVX/VAG DOC THIN PREP: ABNORMAL

## (undated) DEVICE — FORCEP RADIAL JAW 4 JUMBO 2.8MM 3.2MM 240CM ORANGE DISP

## (undated) DEVICE — POLY TRAP ETRAP

## (undated) DEVICE — FOLEY HOLDER STATLOCK 2 WAY ADULT

## (undated) DEVICE — SNARE CAPTIVATOR COLD RND STIFF 10X2.4X2.8MM 240CM

## (undated) DEVICE — CATH IV SAFE BC 20G X 1.16" (PINK)

## (undated) DEVICE — ELCTR GROUNDING PAD ADULT COVIDIEN

## (undated) DEVICE — SUCTION YANKAUER NO CONTROL VENT

## (undated) DEVICE — IRRIGATOR BIO SHIELD

## (undated) DEVICE — CATH IV SAFE BC 22G X 1" (BLUE)

## (undated) DEVICE — CLAMP BX HOT RAD JAW 3

## (undated) DEVICE — SENSOR O2 FINGER ADULT

## (undated) DEVICE — TUBING SUCTION 20FT

## (undated) DEVICE — TUBING SUCTION CONN 6FT STERILE

## (undated) DEVICE — SYR LUER LOK 50CC

## (undated) DEVICE — SOL INJ NS 0.9% 500ML 2 PORT

## (undated) DEVICE — TUBING IV SET GRAVITY 3Y 100" MACRO

## (undated) DEVICE — PACK IV START WITH CHG

## (undated) DEVICE — BRUSH COLONOSCOPY CYTOLOGY

## (undated) DEVICE — BIOPSY FORCEP RADIAL JAW 4 STANDARD WITH NEEDLE